# Patient Record
Sex: MALE | Race: BLACK OR AFRICAN AMERICAN | NOT HISPANIC OR LATINO | Employment: OTHER | URBAN - METROPOLITAN AREA
[De-identification: names, ages, dates, MRNs, and addresses within clinical notes are randomized per-mention and may not be internally consistent; named-entity substitution may affect disease eponyms.]

---

## 2023-03-07 LAB
ALBUMIN (G/DL) IN SER/PLAS: 4.2 G/DL (ref 3.4–5)
ANION GAP IN SER/PLAS: 12 MMOL/L (ref 10–20)
BASOPHILS (10*3/UL) IN BLOOD BY AUTOMATED COUNT: 0.02 X10E9/L (ref 0–0.1)
BASOPHILS/100 LEUKOCYTES IN BLOOD BY AUTOMATED COUNT: 0.5 % (ref 0–2)
BK VIRUS LOG PCR: 2.64 LOG IU/ML
BK VIRUS PCR QUANT: 441 IU/ML
CALCIUM (MG/DL) IN SER/PLAS: 9.9 MG/DL (ref 8.6–10.6)
CARBON DIOXIDE, TOTAL (MMOL/L) IN SER/PLAS: 29 MMOL/L (ref 21–32)
CHLORIDE (MMOL/L) IN SER/PLAS: 103 MMOL/L (ref 98–107)
CREATININE (MG/DL) IN SER/PLAS: 1.33 MG/DL (ref 0.5–1.3)
EOSINOPHILS (10*3/UL) IN BLOOD BY AUTOMATED COUNT: 0.05 X10E9/L (ref 0–0.7)
EOSINOPHILS/100 LEUKOCYTES IN BLOOD BY AUTOMATED COUNT: 1.2 % (ref 0–6)
ERYTHROCYTE DISTRIBUTION WIDTH (RATIO) BY AUTOMATED COUNT: 14.4 % (ref 11.5–14.5)
ERYTHROCYTE MEAN CORPUSCULAR HEMOGLOBIN CONCENTRATION (G/DL) BY AUTOMATED: 30.7 G/DL (ref 32–36)
ERYTHROCYTE MEAN CORPUSCULAR VOLUME (FL) BY AUTOMATED COUNT: 100 FL (ref 80–100)
ERYTHROCYTES (10*6/UL) IN BLOOD BY AUTOMATED COUNT: 4.1 X10E12/L (ref 4.5–5.9)
GFR MALE: 57 ML/MIN/1.73M2
GLUCOSE (MG/DL) IN SER/PLAS: 112 MG/DL (ref 74–99)
HEMATOCRIT (%) IN BLOOD BY AUTOMATED COUNT: 41.1 % (ref 41–52)
HEMOGLOBIN (G/DL) IN BLOOD: 12.6 G/DL (ref 13.5–17.5)
IMMATURE GRANULOCYTES/100 LEUKOCYTES IN BLOOD BY AUTOMATED COUNT: 0.5 % (ref 0–0.9)
LEUKOCYTES (10*3/UL) IN BLOOD BY AUTOMATED COUNT: 4.2 X10E9/L (ref 4.4–11.3)
LYMPHOCYTES (10*3/UL) IN BLOOD BY AUTOMATED COUNT: 0.76 X10E9/L (ref 1.2–4.8)
LYMPHOCYTES/100 LEUKOCYTES IN BLOOD BY AUTOMATED COUNT: 18.3 % (ref 13–44)
MONOCYTES (10*3/UL) IN BLOOD BY AUTOMATED COUNT: 0.45 X10E9/L (ref 0.1–1)
MONOCYTES/100 LEUKOCYTES IN BLOOD BY AUTOMATED COUNT: 10.8 % (ref 2–10)
NEUTROPHILS (10*3/UL) IN BLOOD BY AUTOMATED COUNT: 2.85 X10E9/L (ref 1.2–7.7)
NEUTROPHILS/100 LEUKOCYTES IN BLOOD BY AUTOMATED COUNT: 68.7 % (ref 40–80)
NRBC (PER 100 WBCS) BY AUTOMATED COUNT: 0 /100 WBC (ref 0–0)
PHOSPHATE (MG/DL) IN SER/PLAS: 2.6 MG/DL (ref 2.5–4.9)
PLATELETS (10*3/UL) IN BLOOD AUTOMATED COUNT: 152 X10E9/L (ref 150–450)
POTASSIUM (MMOL/L) IN SER/PLAS: 4.1 MMOL/L (ref 3.5–5.3)
SODIUM (MMOL/L) IN SER/PLAS: 140 MMOL/L (ref 136–145)
TACROLIMUS (NG/ML) IN BLOOD: 6.1 NG/ML (ref 2–15)
UREA NITROGEN (MG/DL) IN SER/PLAS: 22 MG/DL (ref 6–23)

## 2023-03-20 LAB
ALBUMIN (G/DL) IN SER/PLAS: 4 G/DL (ref 3.4–5)
ANION GAP IN SER/PLAS: 13 MMOL/L (ref 10–20)
BASOPHILS (10*3/UL) IN BLOOD BY AUTOMATED COUNT: 0.03 X10E9/L (ref 0–0.1)
BASOPHILS/100 LEUKOCYTES IN BLOOD BY AUTOMATED COUNT: 0.4 % (ref 0–2)
CALCIUM (MG/DL) IN SER/PLAS: 9.9 MG/DL (ref 8.6–10.6)
CARBON DIOXIDE, TOTAL (MMOL/L) IN SER/PLAS: 30 MMOL/L (ref 21–32)
CHLORIDE (MMOL/L) IN SER/PLAS: 101 MMOL/L (ref 98–107)
CREATININE (MG/DL) IN SER/PLAS: 1.47 MG/DL (ref 0.5–1.3)
CREATININE (MG/DL) IN URINE: 89.6 MG/DL (ref 20–370)
EOSINOPHILS (10*3/UL) IN BLOOD BY AUTOMATED COUNT: 0.05 X10E9/L (ref 0–0.7)
EOSINOPHILS/100 LEUKOCYTES IN BLOOD BY AUTOMATED COUNT: 0.6 % (ref 0–6)
ERYTHROCYTE DISTRIBUTION WIDTH (RATIO) BY AUTOMATED COUNT: 14 % (ref 11.5–14.5)
ERYTHROCYTE MEAN CORPUSCULAR HEMOGLOBIN CONCENTRATION (G/DL) BY AUTOMATED: 31.7 G/DL (ref 32–36)
ERYTHROCYTE MEAN CORPUSCULAR VOLUME (FL) BY AUTOMATED COUNT: 99 FL (ref 80–100)
ERYTHROCYTES (10*6/UL) IN BLOOD BY AUTOMATED COUNT: 4.09 X10E12/L (ref 4.5–5.9)
GFR MALE: 51 ML/MIN/1.73M2
GLUCOSE (MG/DL) IN SER/PLAS: 111 MG/DL (ref 74–99)
HEMATOCRIT (%) IN BLOOD BY AUTOMATED COUNT: 40.4 % (ref 41–52)
HEMOGLOBIN (G/DL) IN BLOOD: 12.8 G/DL (ref 13.5–17.5)
IMMATURE GRANULOCYTES/100 LEUKOCYTES IN BLOOD BY AUTOMATED COUNT: 0.6 % (ref 0–0.9)
LEUKOCYTES (10*3/UL) IN BLOOD BY AUTOMATED COUNT: 8.4 X10E9/L (ref 4.4–11.3)
LYMPHOCYTES (10*3/UL) IN BLOOD BY AUTOMATED COUNT: 1.13 X10E9/L (ref 1.2–4.8)
LYMPHOCYTES/100 LEUKOCYTES IN BLOOD BY AUTOMATED COUNT: 13.5 % (ref 13–44)
MONOCYTES (10*3/UL) IN BLOOD BY AUTOMATED COUNT: 0.78 X10E9/L (ref 0.1–1)
MONOCYTES/100 LEUKOCYTES IN BLOOD BY AUTOMATED COUNT: 9.3 % (ref 2–10)
NEUTROPHILS (10*3/UL) IN BLOOD BY AUTOMATED COUNT: 6.35 X10E9/L (ref 1.2–7.7)
NEUTROPHILS/100 LEUKOCYTES IN BLOOD BY AUTOMATED COUNT: 75.6 % (ref 40–80)
NRBC (PER 100 WBCS) BY AUTOMATED COUNT: 0 /100 WBC (ref 0–0)
PHOSPHATE (MG/DL) IN SER/PLAS: 3.4 MG/DL (ref 2.5–4.9)
PLATELETS (10*3/UL) IN BLOOD AUTOMATED COUNT: 154 X10E9/L (ref 150–450)
POTASSIUM (MMOL/L) IN SER/PLAS: 3.8 MMOL/L (ref 3.5–5.3)
PROTEIN (MG/DL) IN URINE: 7 MG/DL (ref 5–25)
PROTEIN/CREATININE (MG/MG) IN URINE: 0.08 MG/MG CREAT (ref 0–0.17)
SODIUM (MMOL/L) IN SER/PLAS: 140 MMOL/L (ref 136–145)
TACROLIMUS (NG/ML) IN BLOOD: 5.2 NG/ML (ref 2–15)
UREA NITROGEN (MG/DL) IN SER/PLAS: 23 MG/DL (ref 6–23)

## 2023-03-21 LAB
BK VIRUS LOG PCR: 2.44 LOG IU/ML
BK VIRUS PCR QUANT: 278 IU/ML

## 2023-04-03 LAB
CREATININE (MG/DL) IN URINE: 145 MG/DL (ref 20–370)
PROTEIN (MG/DL) IN URINE: 16 MG/DL (ref 5–25)
PROTEIN/CREATININE (MG/MG) IN URINE: 0.11 MG/MG CREAT (ref 0–0.17)

## 2023-04-17 LAB
ALBUMIN (G/DL) IN SER/PLAS: 4.1 G/DL (ref 3.4–5)
ANION GAP IN SER/PLAS: 13 MMOL/L (ref 10–20)
BASOPHILS (10*3/UL) IN BLOOD BY AUTOMATED COUNT: 0.03 X10E9/L (ref 0–0.1)
BASOPHILS/100 LEUKOCYTES IN BLOOD BY AUTOMATED COUNT: 0.6 % (ref 0–2)
CALCIUM (MG/DL) IN SER/PLAS: 9.9 MG/DL (ref 8.6–10.6)
CARBON DIOXIDE, TOTAL (MMOL/L) IN SER/PLAS: 29 MMOL/L (ref 21–32)
CHLORIDE (MMOL/L) IN SER/PLAS: 102 MMOL/L (ref 98–107)
CREATININE (MG/DL) IN SER/PLAS: 1.43 MG/DL (ref 0.5–1.3)
CREATININE (MG/DL) IN URINE: 41.8 MG/DL (ref 20–370)
EOSINOPHILS (10*3/UL) IN BLOOD BY AUTOMATED COUNT: 0.04 X10E9/L (ref 0–0.7)
EOSINOPHILS/100 LEUKOCYTES IN BLOOD BY AUTOMATED COUNT: 0.9 % (ref 0–6)
ERYTHROCYTE DISTRIBUTION WIDTH (RATIO) BY AUTOMATED COUNT: 13.6 % (ref 11.5–14.5)
ERYTHROCYTE MEAN CORPUSCULAR HEMOGLOBIN CONCENTRATION (G/DL) BY AUTOMATED: 31.3 G/DL (ref 32–36)
ERYTHROCYTE MEAN CORPUSCULAR VOLUME (FL) BY AUTOMATED COUNT: 98 FL (ref 80–100)
ERYTHROCYTES (10*6/UL) IN BLOOD BY AUTOMATED COUNT: 4.38 X10E12/L (ref 4.5–5.9)
GFR MALE: 53 ML/MIN/1.73M2
GLUCOSE (MG/DL) IN SER/PLAS: 124 MG/DL (ref 74–99)
HEMATOCRIT (%) IN BLOOD BY AUTOMATED COUNT: 42.8 % (ref 41–52)
HEMOGLOBIN (G/DL) IN BLOOD: 13.4 G/DL (ref 13.5–17.5)
IMMATURE GRANULOCYTES/100 LEUKOCYTES IN BLOOD BY AUTOMATED COUNT: 0.4 % (ref 0–0.9)
LEUKOCYTES (10*3/UL) IN BLOOD BY AUTOMATED COUNT: 4.6 X10E9/L (ref 4.4–11.3)
LYMPHOCYTES (10*3/UL) IN BLOOD BY AUTOMATED COUNT: 0.93 X10E9/L (ref 1.2–4.8)
LYMPHOCYTES/100 LEUKOCYTES IN BLOOD BY AUTOMATED COUNT: 20 % (ref 13–44)
MONOCYTES (10*3/UL) IN BLOOD BY AUTOMATED COUNT: 0.52 X10E9/L (ref 0.1–1)
MONOCYTES/100 LEUKOCYTES IN BLOOD BY AUTOMATED COUNT: 11.2 % (ref 2–10)
NEUTROPHILS (10*3/UL) IN BLOOD BY AUTOMATED COUNT: 3.1 X10E9/L (ref 1.2–7.7)
NEUTROPHILS/100 LEUKOCYTES IN BLOOD BY AUTOMATED COUNT: 66.9 % (ref 40–80)
NRBC (PER 100 WBCS) BY AUTOMATED COUNT: 0 /100 WBC (ref 0–0)
PHOSPHATE (MG/DL) IN SER/PLAS: 2.9 MG/DL (ref 2.5–4.9)
PLATELETS (10*3/UL) IN BLOOD AUTOMATED COUNT: 136 X10E9/L (ref 150–450)
POTASSIUM (MMOL/L) IN SER/PLAS: 4.7 MMOL/L (ref 3.5–5.3)
PROTEIN (MG/DL) IN URINE: <4 MG/DL (ref 5–25)
PROTEIN/CREATININE (MG/MG) IN URINE: ABNORMAL MG/MG CREAT (ref 0–0.17)
SODIUM (MMOL/L) IN SER/PLAS: 139 MMOL/L (ref 136–145)
TACROLIMUS (NG/ML) IN BLOOD: 8.8 NG/ML (ref 2–15)
UREA NITROGEN (MG/DL) IN SER/PLAS: 19 MG/DL (ref 6–23)

## 2023-04-18 LAB
BK VIRUS LOG PCR: 2.15 LOG IU/ML
BK VIRUS PCR QUANT: 141 IU/ML

## 2023-05-01 LAB
ALBUMIN (G/DL) IN SER/PLAS: 4.2 G/DL (ref 3.4–5)
ANION GAP IN SER/PLAS: 14 MMOL/L (ref 10–20)
CALCIUM (MG/DL) IN SER/PLAS: 9.7 MG/DL (ref 8.6–10.6)
CARBON DIOXIDE, TOTAL (MMOL/L) IN SER/PLAS: 30 MMOL/L (ref 21–32)
CHLORIDE (MMOL/L) IN SER/PLAS: 99 MMOL/L (ref 98–107)
CREATININE (MG/DL) IN SER/PLAS: 1.62 MG/DL (ref 0.5–1.3)
ERYTHROCYTE DISTRIBUTION WIDTH (RATIO) BY AUTOMATED COUNT: 14.2 % (ref 11.5–14.5)
ERYTHROCYTE MEAN CORPUSCULAR HEMOGLOBIN CONCENTRATION (G/DL) BY AUTOMATED: 31.3 G/DL (ref 32–36)
ERYTHROCYTE MEAN CORPUSCULAR VOLUME (FL) BY AUTOMATED COUNT: 97 FL (ref 80–100)
ERYTHROCYTES (10*6/UL) IN BLOOD BY AUTOMATED COUNT: 4.65 X10E12/L (ref 4.5–5.9)
GFR MALE: 45 ML/MIN/1.73M2
GLUCOSE (MG/DL) IN SER/PLAS: 128 MG/DL (ref 74–99)
HEMATOCRIT (%) IN BLOOD BY AUTOMATED COUNT: 45.3 % (ref 41–52)
HEMOGLOBIN (G/DL) IN BLOOD: 14.2 G/DL (ref 13.5–17.5)
LEUKOCYTES (10*3/UL) IN BLOOD BY AUTOMATED COUNT: 5.7 X10E9/L (ref 4.4–11.3)
MAGNESIUM (MG/DL) IN SER/PLAS: 1.94 MG/DL (ref 1.6–2.4)
NRBC (PER 100 WBCS) BY AUTOMATED COUNT: 0 /100 WBC (ref 0–0)
PHOSPHATE (MG/DL) IN SER/PLAS: 2.6 MG/DL (ref 2.5–4.9)
PLATELETS (10*3/UL) IN BLOOD AUTOMATED COUNT: 125 X10E9/L (ref 150–450)
POTASSIUM (MMOL/L) IN SER/PLAS: 3.7 MMOL/L (ref 3.5–5.3)
SODIUM (MMOL/L) IN SER/PLAS: 139 MMOL/L (ref 136–145)
UREA NITROGEN (MG/DL) IN SER/PLAS: 24 MG/DL (ref 6–23)

## 2023-05-02 LAB
BK VIRUS LOG PCR: 2.59 LOG IU/ML
BK VIRUS PCR QUANT: 386 IU/ML
CALCIDIOL (25 OH VITAMIN D3) (NG/ML) IN SER/PLAS: 30 NG/ML
CREATININE (MG/DL) IN URINE: 31.2 MG/DL (ref 20–370)
PARATHYRIN INTACT (PG/ML) IN SER/PLAS: 95.4 PG/ML (ref 18.5–88)
PROTEIN (MG/DL) IN URINE: <4 MG/DL (ref 5–25)
PROTEIN/CREATININE (MG/MG) IN URINE: ABNORMAL MG/MG CREAT (ref 0–0.17)
TACROLIMUS (NG/ML) IN BLOOD: 4.9 NG/ML (ref 2–15)

## 2023-05-12 LAB
ERYTHROCYTE DISTRIBUTION WIDTH (RATIO) BY AUTOMATED COUNT: 14.5 % (ref 11.5–14.5)
ERYTHROCYTE MEAN CORPUSCULAR HEMOGLOBIN CONCENTRATION (G/DL) BY AUTOMATED: 31.3 G/DL (ref 32–36)
ERYTHROCYTE MEAN CORPUSCULAR VOLUME (FL) BY AUTOMATED COUNT: 97 FL (ref 80–100)
ERYTHROCYTES (10*6/UL) IN BLOOD BY AUTOMATED COUNT: 4.95 X10E12/L (ref 4.5–5.9)
HEMATOCRIT (%) IN BLOOD BY AUTOMATED COUNT: 47.9 % (ref 41–52)
HEMOGLOBIN (G/DL) IN BLOOD: 15 G/DL (ref 13.5–17.5)
LEUKOCYTES (10*3/UL) IN BLOOD BY AUTOMATED COUNT: 7.3 X10E9/L (ref 4.4–11.3)
NRBC (PER 100 WBCS) BY AUTOMATED COUNT: 0 /100 WBC (ref 0–0)
PLATELETS (10*3/UL) IN BLOOD AUTOMATED COUNT: 144 X10E9/L (ref 150–450)
TACROLIMUS (NG/ML) IN BLOOD: 5.1 NG/ML (ref 2–15)

## 2023-05-13 LAB
ALBUMIN (G/DL) IN SER/PLAS: 4.1 G/DL (ref 3.4–5)
ANION GAP IN SER/PLAS: 14 MMOL/L (ref 10–20)
CALCIDIOL (25 OH VITAMIN D3) (NG/ML) IN SER/PLAS: 32 NG/ML
CALCIUM (MG/DL) IN SER/PLAS: 10 MG/DL (ref 8.6–10.6)
CARBON DIOXIDE, TOTAL (MMOL/L) IN SER/PLAS: 27 MMOL/L (ref 21–32)
CHLORIDE (MMOL/L) IN SER/PLAS: 102 MMOL/L (ref 98–107)
CREATININE (MG/DL) IN SER/PLAS: 1.5 MG/DL (ref 0.5–1.3)
CREATININE (MG/DL) IN URINE: 38.9 MG/DL (ref 20–370)
GFR MALE: 50 ML/MIN/1.73M2
GLUCOSE (MG/DL) IN SER/PLAS: 177 MG/DL (ref 74–99)
MAGNESIUM (MG/DL) IN SER/PLAS: 2.01 MG/DL (ref 1.6–2.4)
PARATHYRIN INTACT (PG/ML) IN SER/PLAS: 166.3 PG/ML (ref 18.5–88)
PHOSPHATE (MG/DL) IN SER/PLAS: 2.5 MG/DL (ref 2.5–4.9)
POTASSIUM (MMOL/L) IN SER/PLAS: 3.7 MMOL/L (ref 3.5–5.3)
PROTEIN (MG/DL) IN URINE: 7 MG/DL (ref 5–25)
PROTEIN/CREATININE (MG/MG) IN URINE: 0.18 MG/MG CREAT (ref 0–0.17)
SODIUM (MMOL/L) IN SER/PLAS: 139 MMOL/L (ref 136–145)
UREA NITROGEN (MG/DL) IN SER/PLAS: 16 MG/DL (ref 6–23)

## 2023-05-14 LAB
BK VIRUS LOG PCR: 2.72 LOG IU/ML
BK VIRUS PCR QUANT: 524 IU/ML

## 2023-06-01 LAB
ALBUMIN (G/DL) IN SER/PLAS: 4.2 G/DL (ref 3.4–5)
ANION GAP IN SER/PLAS: 18 MMOL/L (ref 10–20)
BK VIRUS LOG PCR: 2.81 LOG IU/ML
BK VIRUS PCR QUANT: 644 IU/ML
CALCIDIOL (25 OH VITAMIN D3) (NG/ML) IN SER/PLAS: 31 NG/ML
CALCIUM (MG/DL) IN SER/PLAS: 10.2 MG/DL (ref 8.6–10.6)
CARBON DIOXIDE, TOTAL (MMOL/L) IN SER/PLAS: 26 MMOL/L (ref 21–32)
CHLORIDE (MMOL/L) IN SER/PLAS: 100 MMOL/L (ref 98–107)
CREATININE (MG/DL) IN SER/PLAS: 1.36 MG/DL (ref 0.5–1.3)
CREATININE (MG/DL) IN URINE: 23.7 MG/DL (ref 20–370)
ERYTHROCYTE DISTRIBUTION WIDTH (RATIO) BY AUTOMATED COUNT: 15.1 % (ref 11.5–14.5)
ERYTHROCYTE MEAN CORPUSCULAR HEMOGLOBIN CONCENTRATION (G/DL) BY AUTOMATED: 30.6 G/DL (ref 32–36)
ERYTHROCYTE MEAN CORPUSCULAR VOLUME (FL) BY AUTOMATED COUNT: 100 FL (ref 80–100)
ERYTHROCYTES (10*6/UL) IN BLOOD BY AUTOMATED COUNT: 4.92 X10E12/L (ref 4.5–5.9)
GFR MALE: 56 ML/MIN/1.73M2
GLUCOSE (MG/DL) IN SER/PLAS: 148 MG/DL (ref 74–99)
HEMATOCRIT (%) IN BLOOD BY AUTOMATED COUNT: 49.3 % (ref 41–52)
HEMOGLOBIN (G/DL) IN BLOOD: 15.1 G/DL (ref 13.5–17.5)
LEUKOCYTES (10*3/UL) IN BLOOD BY AUTOMATED COUNT: 7.9 X10E9/L (ref 4.4–11.3)
MAGNESIUM (MG/DL) IN SER/PLAS: 1.87 MG/DL (ref 1.6–2.4)
NRBC (PER 100 WBCS) BY AUTOMATED COUNT: 0 /100 WBC (ref 0–0)
PARATHYRIN INTACT (PG/ML) IN SER/PLAS: 126.1 PG/ML (ref 18.5–88)
PHOSPHATE (MG/DL) IN SER/PLAS: 3.2 MG/DL (ref 2.5–4.9)
PLATELETS (10*3/UL) IN BLOOD AUTOMATED COUNT: 131 X10E9/L (ref 150–450)
POTASSIUM (MMOL/L) IN SER/PLAS: 3.8 MMOL/L (ref 3.5–5.3)
PROTEIN (MG/DL) IN URINE: <4 MG/DL (ref 5–25)
PROTEIN/CREATININE (MG/MG) IN URINE: ABNORMAL MG/MG CREAT (ref 0–0.17)
SODIUM (MMOL/L) IN SER/PLAS: 140 MMOL/L (ref 136–145)
TACROLIMUS (NG/ML) IN BLOOD: 5.9 NG/ML (ref 2–15)
UREA NITROGEN (MG/DL) IN SER/PLAS: 18 MG/DL (ref 6–23)

## 2023-07-03 LAB
ALBUMIN (G/DL) IN SER/PLAS: 3.9 G/DL (ref 3.4–5)
ANION GAP IN SER/PLAS: 13 MMOL/L (ref 10–20)
CALCIDIOL (25 OH VITAMIN D3) (NG/ML) IN SER/PLAS: 30 NG/ML
CALCIUM (MG/DL) IN SER/PLAS: 9.6 MG/DL (ref 8.6–10.6)
CARBON DIOXIDE, TOTAL (MMOL/L) IN SER/PLAS: 29 MMOL/L (ref 21–32)
CHLORIDE (MMOL/L) IN SER/PLAS: 104 MMOL/L (ref 98–107)
CREATININE (MG/DL) IN SER/PLAS: 1.44 MG/DL (ref 0.5–1.3)
CREATININE (MG/DL) IN URINE: 131 MG/DL (ref 20–370)
ERYTHROCYTE DISTRIBUTION WIDTH (RATIO) BY AUTOMATED COUNT: 15.2 % (ref 11.5–14.5)
ERYTHROCYTE MEAN CORPUSCULAR HEMOGLOBIN CONCENTRATION (G/DL) BY AUTOMATED: 31.7 G/DL (ref 32–36)
ERYTHROCYTE MEAN CORPUSCULAR VOLUME (FL) BY AUTOMATED COUNT: 99 FL (ref 80–100)
ERYTHROCYTES (10*6/UL) IN BLOOD BY AUTOMATED COUNT: 4.38 X10E12/L (ref 4.5–5.9)
GFR MALE: 52 ML/MIN/1.73M2
GLUCOSE (MG/DL) IN SER/PLAS: 133 MG/DL (ref 74–99)
HEMATOCRIT (%) IN BLOOD BY AUTOMATED COUNT: 43.2 % (ref 41–52)
HEMOGLOBIN (G/DL) IN BLOOD: 13.7 G/DL (ref 13.5–17.5)
LEUKOCYTES (10*3/UL) IN BLOOD BY AUTOMATED COUNT: 4.7 X10E9/L (ref 4.4–11.3)
MAGNESIUM (MG/DL) IN SER/PLAS: 1.53 MG/DL (ref 1.6–2.4)
NRBC (PER 100 WBCS) BY AUTOMATED COUNT: 0 /100 WBC (ref 0–0)
PARATHYRIN INTACT (PG/ML) IN SER/PLAS: 88.5 PG/ML (ref 18.5–88)
PHOSPHATE (MG/DL) IN SER/PLAS: 2.4 MG/DL (ref 2.5–4.9)
PLATELETS (10*3/UL) IN BLOOD AUTOMATED COUNT: 112 X10E9/L (ref 150–450)
POTASSIUM (MMOL/L) IN SER/PLAS: 3.9 MMOL/L (ref 3.5–5.3)
PROTEIN (MG/DL) IN URINE: 10 MG/DL (ref 5–25)
PROTEIN/CREATININE (MG/MG) IN URINE: 0.08 MG/MG CREAT (ref 0–0.17)
SODIUM (MMOL/L) IN SER/PLAS: 142 MMOL/L (ref 136–145)
TACROLIMUS (NG/ML) IN BLOOD: 5.4 NG/ML (ref 2–15)
UREA NITROGEN (MG/DL) IN SER/PLAS: 15 MG/DL (ref 6–23)

## 2023-07-05 LAB
BK VIRUS LOG PCR: 1.71 LOG IU/ML
BK VIRUS PCR QUANT: 51 IU/ML

## 2023-08-01 LAB
ALBUMIN (G/DL) IN SER/PLAS: 4.2 G/DL (ref 3.4–5)
ANION GAP IN SER/PLAS: 14 MMOL/L (ref 10–20)
BASOPHILS (10*3/UL) IN BLOOD BY AUTOMATED COUNT: 0.02 X10E9/L (ref 0–0.1)
BASOPHILS/100 LEUKOCYTES IN BLOOD BY AUTOMATED COUNT: 0.4 % (ref 0–2)
BK VIRUS LOG PCR: 1.43 LOG IU/ML
BK VIRUS PCR QUANT: 27 IU/ML
CALCIDIOL (25 OH VITAMIN D3) (NG/ML) IN SER/PLAS: 34 NG/ML
CALCIUM (MG/DL) IN SER/PLAS: 9.1 MG/DL (ref 8.6–10.6)
CARBON DIOXIDE, TOTAL (MMOL/L) IN SER/PLAS: 27 MMOL/L (ref 21–32)
CHLORIDE (MMOL/L) IN SER/PLAS: 102 MMOL/L (ref 98–107)
CREATININE (MG/DL) IN SER/PLAS: 1.43 MG/DL (ref 0.5–1.3)
CREATININE (MG/DL) IN URINE: 74.9 MG/DL (ref 20–370)
EOSINOPHILS (10*3/UL) IN BLOOD BY AUTOMATED COUNT: 0.03 X10E9/L (ref 0–0.7)
EOSINOPHILS/100 LEUKOCYTES IN BLOOD BY AUTOMATED COUNT: 0.6 % (ref 0–6)
ERYTHROCYTE DISTRIBUTION WIDTH (RATIO) BY AUTOMATED COUNT: 14.5 % (ref 11.5–14.5)
ERYTHROCYTE MEAN CORPUSCULAR HEMOGLOBIN CONCENTRATION (G/DL) BY AUTOMATED: 32.5 G/DL (ref 32–36)
ERYTHROCYTE MEAN CORPUSCULAR VOLUME (FL) BY AUTOMATED COUNT: 98 FL (ref 80–100)
ERYTHROCYTES (10*6/UL) IN BLOOD BY AUTOMATED COUNT: 4.29 X10E12/L (ref 4.5–5.9)
GFR MALE: 52 ML/MIN/1.73M2
GLUCOSE (MG/DL) IN SER/PLAS: 139 MG/DL (ref 74–99)
HEMATOCRIT (%) IN BLOOD BY AUTOMATED COUNT: 42.2 % (ref 41–52)
HEMOGLOBIN (G/DL) IN BLOOD: 13.7 G/DL (ref 13.5–17.5)
IMMATURE GRANULOCYTES/100 LEUKOCYTES IN BLOOD BY AUTOMATED COUNT: 0.4 % (ref 0–0.9)
LEUKOCYTES (10*3/UL) IN BLOOD BY AUTOMATED COUNT: 4.9 X10E9/L (ref 4.4–11.3)
LYMPHOCYTES (10*3/UL) IN BLOOD BY AUTOMATED COUNT: 0.69 X10E9/L (ref 1.2–4.8)
LYMPHOCYTES/100 LEUKOCYTES IN BLOOD BY AUTOMATED COUNT: 14.2 % (ref 13–44)
MAGNESIUM (MG/DL) IN SER/PLAS: 1.64 MG/DL (ref 1.6–2.4)
MONOCYTES (10*3/UL) IN BLOOD BY AUTOMATED COUNT: 0.39 X10E9/L (ref 0.1–1)
MONOCYTES/100 LEUKOCYTES IN BLOOD BY AUTOMATED COUNT: 8 % (ref 2–10)
NEUTROPHILS (10*3/UL) IN BLOOD BY AUTOMATED COUNT: 3.71 X10E9/L (ref 1.2–7.7)
NEUTROPHILS/100 LEUKOCYTES IN BLOOD BY AUTOMATED COUNT: 76.4 % (ref 40–80)
NRBC (PER 100 WBCS) BY AUTOMATED COUNT: 0 /100 WBC (ref 0–0)
PHOSPHATE (MG/DL) IN SER/PLAS: 1.9 MG/DL (ref 2.5–4.9)
PLATELETS (10*3/UL) IN BLOOD AUTOMATED COUNT: 115 X10E9/L (ref 150–450)
POTASSIUM (MMOL/L) IN SER/PLAS: 3.8 MMOL/L (ref 3.5–5.3)
PROTEIN (MG/DL) IN URINE: 5 MG/DL (ref 5–25)
PROTEIN/CREATININE (MG/MG) IN URINE: 0.07 MG/MG CREAT (ref 0–0.17)
SODIUM (MMOL/L) IN SER/PLAS: 139 MMOL/L (ref 136–145)
TACROLIMUS (NG/ML) IN BLOOD: 6.9 NG/ML (ref 2–15)
UREA NITROGEN (MG/DL) IN SER/PLAS: 14 MG/DL (ref 6–23)

## 2023-10-03 ENCOUNTER — PHARMACY VISIT (OUTPATIENT)
Dept: PHARMACY | Facility: CLINIC | Age: 73
End: 2023-10-03
Payer: MEDICARE

## 2023-10-03 PROCEDURE — RXMED WILLOW AMBULATORY MEDICATION CHARGE

## 2023-11-01 ENCOUNTER — PHARMACY VISIT (OUTPATIENT)
Dept: PHARMACY | Facility: CLINIC | Age: 73
End: 2023-11-01
Payer: MEDICARE

## 2023-11-01 PROCEDURE — RXMED WILLOW AMBULATORY MEDICATION CHARGE

## 2023-11-02 ENCOUNTER — SPECIALTY PHARMACY (OUTPATIENT)
Dept: PHARMACY | Facility: CLINIC | Age: 73
End: 2023-11-02

## 2023-11-03 ENCOUNTER — SPECIALTY PHARMACY (OUTPATIENT)
Dept: PHARMACY | Facility: CLINIC | Age: 73
End: 2023-11-03

## 2023-11-06 ENCOUNTER — LAB (OUTPATIENT)
Dept: LAB | Facility: LAB | Age: 73
End: 2023-11-06
Payer: MEDICARE

## 2023-11-06 DIAGNOSIS — Z94.0 KIDNEY TRANSPLANT STATUS (HHS-HCC): Primary | ICD-10-CM

## 2023-11-06 DIAGNOSIS — B34.8 OTHER VIRAL INFECTIONS OF UNSPECIFIED SITE: ICD-10-CM

## 2023-11-06 DIAGNOSIS — E55.9 VITAMIN D DEFICIENCY, UNSPECIFIED: ICD-10-CM

## 2023-11-06 PROCEDURE — 85027 COMPLETE CBC AUTOMATED: CPT

## 2023-11-06 PROCEDURE — 87799 DETECT AGENT NOS DNA QUANT: CPT

## 2023-11-06 PROCEDURE — 82306 VITAMIN D 25 HYDROXY: CPT

## 2023-11-06 PROCEDURE — 82570 ASSAY OF URINE CREATININE: CPT

## 2023-11-06 PROCEDURE — 84156 ASSAY OF PROTEIN URINE: CPT

## 2023-11-06 PROCEDURE — 80069 RENAL FUNCTION PANEL: CPT

## 2023-11-06 PROCEDURE — 36415 COLL VENOUS BLD VENIPUNCTURE: CPT

## 2023-11-06 PROCEDURE — 83970 ASSAY OF PARATHORMONE: CPT

## 2023-11-06 PROCEDURE — 83735 ASSAY OF MAGNESIUM: CPT

## 2023-11-06 PROCEDURE — 80197 ASSAY OF TACROLIMUS: CPT

## 2023-11-07 ENCOUNTER — TELEPHONE (OUTPATIENT)
Dept: TRANSPLANT | Facility: HOSPITAL | Age: 73
End: 2023-11-07
Payer: MEDICARE

## 2023-11-07 DIAGNOSIS — Z94.0 KIDNEY REPLACED BY TRANSPLANT (HHS-HCC): ICD-10-CM

## 2023-11-07 LAB
25(OH)D3 SERPL-MCNC: 34 NG/ML (ref 30–100)
ALBUMIN SERPL BCP-MCNC: 4 G/DL (ref 3.4–5)
ALLOSURE SCORE - KIDNEY: 0.22 %
ANION GAP SERPL CALC-SCNC: 16 MMOL/L (ref 10–20)
BKV DNA SERPL NAA+PROBE-LOG#: NORMAL {LOG_COPIES}/ML
BUN SERPL-MCNC: 16 MG/DL (ref 6–23)
CALCIUM SERPL-MCNC: 9.6 MG/DL (ref 8.6–10.6)
CHLORIDE SERPL-SCNC: 103 MMOL/L (ref 98–107)
CO2 SERPL-SCNC: 24 MMOL/L (ref 21–32)
CREAT SERPL-MCNC: 1.37 MG/DL (ref 0.5–1.3)
ERYTHROCYTE [DISTWIDTH] IN BLOOD BY AUTOMATED COUNT: 13.6 % (ref 11.5–14.5)
GFR SERPL CREATININE-BSD FRML MDRD: 55 ML/MIN/1.73M*2
GLUCOSE SERPL-MCNC: 110 MG/DL (ref 74–99)
HCT VFR BLD AUTO: 40.7 % (ref 41–52)
HGB BLD-MCNC: 13 G/DL (ref 13.5–17.5)
LABORATORY COMMENT REPORT: NOT DETECTED
MAGNESIUM SERPL-MCNC: 1.61 MG/DL (ref 1.6–2.4)
MCH RBC QN AUTO: 31.6 PG (ref 26–34)
MCHC RBC AUTO-ENTMCNC: 31.9 G/DL (ref 32–36)
MCV RBC AUTO: 99 FL (ref 80–100)
NRBC BLD-RTO: 0 /100 WBCS (ref 0–0)
PHOSPHATE SERPL-MCNC: 2.4 MG/DL (ref 2.5–4.9)
PLATELET # BLD AUTO: 111 X10*3/UL (ref 150–450)
POTASSIUM SERPL-SCNC: 3.9 MMOL/L (ref 3.5–5.3)
PTH-INTACT SERPL-MCNC: 89 PG/ML (ref 18.5–88)
RBC # BLD AUTO: 4.12 X10*6/UL (ref 4.5–5.9)
SODIUM SERPL-SCNC: 139 MMOL/L (ref 136–145)
TACROLIMUS BLD-MCNC: 8.4 NG/ML
WBC # BLD AUTO: 5.2 X10*3/UL (ref 4.4–11.3)

## 2023-11-07 NOTE — TELEPHONE ENCOUNTER
Called pt back after review labs with Dr. Adam decreased Envarsus to 7mg daily repeat level in 1 week

## 2023-11-08 LAB
CREAT UR-MCNC: 84.4 MG/DL (ref 20–370)
PROT UR-ACNC: 5 MG/DL (ref 5–25)
PROT/CREAT UR: 0.06 MG/MG CREAT (ref 0–0.17)

## 2023-11-09 ENCOUNTER — PHARMACY VISIT (OUTPATIENT)
Dept: PHARMACY | Facility: CLINIC | Age: 73
End: 2023-11-09
Payer: MEDICARE

## 2023-11-09 ENCOUNTER — SPECIALTY PHARMACY (OUTPATIENT)
Dept: PHARMACY | Facility: CLINIC | Age: 73
End: 2023-11-09

## 2023-11-09 LAB
ALLOSURE SCORE - KIDNEY: 0.17 %
CAREDX_ORDER_ID: NORMAL
CENTER_ORDER_ID: NORMAL
CLIENT SPECIMEN ID - ALLOSURE: NORMAL
DONOR RELATION - ALLOSURE: NORMAL
NOTES - ALLOSURE: NORMAL
RELATIVE CHANGE VALUE - KIDNEY: NORMAL %
TEST COMMENTS - ALLOSURE: NORMAL
TIME POST TX - ALLOSURE: NORMAL
TRANSPLANTED ORGAN - ALLOSURE: NORMAL
TX DATE - ALLOSURE/ALLOMAP: NORMAL
WP_ORDER_ID: NORMAL

## 2023-11-10 ENCOUNTER — TELEPHONE (OUTPATIENT)
Dept: TRANSPLANT | Facility: HOSPITAL | Age: 73
End: 2023-11-10
Payer: MEDICARE

## 2023-11-10 DIAGNOSIS — Z94.0 KIDNEY REPLACED BY TRANSPLANT (HHS-HCC): ICD-10-CM

## 2023-11-10 DIAGNOSIS — Z94.0 KIDNEY REPLACED BY TRANSPLANT (HHS-HCC): Primary | ICD-10-CM

## 2023-11-13 ENCOUNTER — PHARMACY VISIT (OUTPATIENT)
Dept: PHARMACY | Facility: CLINIC | Age: 73
End: 2023-11-13
Payer: MEDICARE

## 2023-11-13 ENCOUNTER — SPECIALTY PHARMACY (OUTPATIENT)
Dept: PHARMACY | Facility: CLINIC | Age: 73
End: 2023-11-13

## 2023-11-13 ENCOUNTER — TELEPHONE (OUTPATIENT)
Dept: TRANSPLANT | Facility: HOSPITAL | Age: 73
End: 2023-11-13
Payer: MEDICARE

## 2023-11-13 PROCEDURE — RXMED WILLOW AMBULATORY MEDICATION CHARGE

## 2023-11-21 ENCOUNTER — TELEPHONE (OUTPATIENT)
Dept: TRANSPLANT | Facility: HOSPITAL | Age: 73
End: 2023-11-21
Payer: MEDICARE

## 2023-11-21 DIAGNOSIS — Z94.0 KIDNEY REPLACED BY TRANSPLANT (HHS-HCC): ICD-10-CM

## 2023-11-22 ENCOUNTER — TELEPHONE (OUTPATIENT)
Dept: TRANSPLANT | Facility: HOSPITAL | Age: 73
End: 2023-11-22
Payer: MEDICARE

## 2023-12-04 ENCOUNTER — LAB (OUTPATIENT)
Dept: LAB | Facility: LAB | Age: 73
End: 2023-12-04
Payer: MEDICARE

## 2023-12-04 DIAGNOSIS — Z94.0 KIDNEY REPLACED BY TRANSPLANT (HHS-HCC): ICD-10-CM

## 2023-12-04 LAB
ALBUMIN SERPL BCP-MCNC: 4.1 G/DL (ref 3.4–5)
ANION GAP SERPL CALC-SCNC: 14 MMOL/L (ref 10–20)
BUN SERPL-MCNC: 18 MG/DL (ref 6–23)
CALCIUM SERPL-MCNC: 9.6 MG/DL (ref 8.6–10.6)
CHLORIDE SERPL-SCNC: 103 MMOL/L (ref 98–107)
CO2 SERPL-SCNC: 28 MMOL/L (ref 21–32)
CREAT SERPL-MCNC: 1.34 MG/DL (ref 0.5–1.3)
ERYTHROCYTE [DISTWIDTH] IN BLOOD BY AUTOMATED COUNT: 13.9 % (ref 11.5–14.5)
GFR SERPL CREATININE-BSD FRML MDRD: 57 ML/MIN/1.73M*2
GLUCOSE SERPL-MCNC: 116 MG/DL (ref 74–99)
HCT VFR BLD AUTO: 42.7 % (ref 41–52)
HGB BLD-MCNC: 13.5 G/DL (ref 13.5–17.5)
MAGNESIUM SERPL-MCNC: 1.76 MG/DL (ref 1.6–2.4)
MCH RBC QN AUTO: 31.3 PG (ref 26–34)
MCHC RBC AUTO-ENTMCNC: 31.6 G/DL (ref 32–36)
MCV RBC AUTO: 99 FL (ref 80–100)
NRBC BLD-RTO: 0 /100 WBCS (ref 0–0)
PHOSPHATE SERPL-MCNC: 2.8 MG/DL (ref 2.5–4.9)
PLATELET # BLD AUTO: 129 X10*3/UL (ref 150–450)
POTASSIUM SERPL-SCNC: 4.3 MMOL/L (ref 3.5–5.3)
RBC # BLD AUTO: 4.31 X10*6/UL (ref 4.5–5.9)
SODIUM SERPL-SCNC: 141 MMOL/L (ref 136–145)
TACROLIMUS BLD-MCNC: 8.2 NG/ML
WBC # BLD AUTO: 5.6 X10*3/UL (ref 4.4–11.3)

## 2023-12-04 PROCEDURE — 85027 COMPLETE CBC AUTOMATED: CPT

## 2023-12-04 PROCEDURE — 80069 RENAL FUNCTION PANEL: CPT

## 2023-12-04 PROCEDURE — 36415 COLL VENOUS BLD VENIPUNCTURE: CPT

## 2023-12-04 PROCEDURE — 80197 ASSAY OF TACROLIMUS: CPT

## 2023-12-04 PROCEDURE — 83735 ASSAY OF MAGNESIUM: CPT

## 2023-12-13 ENCOUNTER — SPECIALTY PHARMACY (OUTPATIENT)
Dept: PHARMACY | Facility: CLINIC | Age: 73
End: 2023-12-13

## 2023-12-13 PROCEDURE — RXMED WILLOW AMBULATORY MEDICATION CHARGE

## 2023-12-14 ENCOUNTER — PHARMACY VISIT (OUTPATIENT)
Dept: PHARMACY | Facility: CLINIC | Age: 73
End: 2023-12-14
Payer: MEDICARE

## 2023-12-29 ENCOUNTER — SPECIALTY PHARMACY (OUTPATIENT)
Dept: PHARMACY | Facility: CLINIC | Age: 73
End: 2023-12-29

## 2024-01-03 PROCEDURE — RXMED WILLOW AMBULATORY MEDICATION CHARGE

## 2024-01-08 ENCOUNTER — LAB (OUTPATIENT)
Dept: LAB | Facility: LAB | Age: 74
End: 2024-01-08
Payer: MEDICARE

## 2024-01-08 DIAGNOSIS — Z94.0 KIDNEY REPLACED BY TRANSPLANT (HHS-HCC): ICD-10-CM

## 2024-01-08 LAB
ALBUMIN SERPL BCP-MCNC: 4 G/DL (ref 3.4–5)
ANION GAP SERPL CALC-SCNC: 14 MMOL/L (ref 10–20)
BUN SERPL-MCNC: 15 MG/DL (ref 6–23)
CALCIUM SERPL-MCNC: 9.6 MG/DL (ref 8.6–10.6)
CHLORIDE SERPL-SCNC: 102 MMOL/L (ref 98–107)
CO2 SERPL-SCNC: 29 MMOL/L (ref 21–32)
CREAT SERPL-MCNC: 1.36 MG/DL (ref 0.5–1.3)
CREAT UR-MCNC: 21.1 MG/DL (ref 20–370)
EGFRCR SERPLBLD CKD-EPI 2021: 56 ML/MIN/1.73M*2
ERYTHROCYTE [DISTWIDTH] IN BLOOD BY AUTOMATED COUNT: 14 % (ref 11.5–14.5)
GLUCOSE SERPL-MCNC: 173 MG/DL (ref 74–99)
HCT VFR BLD AUTO: 41.8 % (ref 41–52)
HGB BLD-MCNC: 13.4 G/DL (ref 13.5–17.5)
MAGNESIUM SERPL-MCNC: 1.66 MG/DL (ref 1.6–2.4)
MCH RBC QN AUTO: 31.5 PG (ref 26–34)
MCHC RBC AUTO-ENTMCNC: 32.1 G/DL (ref 32–36)
MCV RBC AUTO: 98 FL (ref 80–100)
NRBC BLD-RTO: 0.5 /100 WBCS (ref 0–0)
PHOSPHATE SERPL-MCNC: 2.7 MG/DL (ref 2.5–4.9)
PLATELET # BLD AUTO: 135 X10*3/UL (ref 150–450)
POTASSIUM SERPL-SCNC: 3.6 MMOL/L (ref 3.5–5.3)
PROT UR-ACNC: <4 MG/DL (ref 5–25)
PROT/CREAT UR: ABNORMAL MG/G{CREAT}
RBC # BLD AUTO: 4.25 X10*6/UL (ref 4.5–5.9)
SODIUM SERPL-SCNC: 141 MMOL/L (ref 136–145)
TACROLIMUS BLD-MCNC: 5 NG/ML
WBC # BLD AUTO: 7.7 X10*3/UL (ref 4.4–11.3)

## 2024-01-08 PROCEDURE — 80197 ASSAY OF TACROLIMUS: CPT

## 2024-01-08 PROCEDURE — 36415 COLL VENOUS BLD VENIPUNCTURE: CPT

## 2024-01-08 PROCEDURE — 84156 ASSAY OF PROTEIN URINE: CPT

## 2024-01-08 PROCEDURE — 83735 ASSAY OF MAGNESIUM: CPT

## 2024-01-08 PROCEDURE — 85027 COMPLETE CBC AUTOMATED: CPT

## 2024-01-08 PROCEDURE — 80069 RENAL FUNCTION PANEL: CPT

## 2024-01-08 PROCEDURE — 82570 ASSAY OF URINE CREATININE: CPT

## 2024-01-18 ENCOUNTER — PHARMACY VISIT (OUTPATIENT)
Dept: PHARMACY | Facility: CLINIC | Age: 74
End: 2024-01-18
Payer: MEDICARE

## 2024-02-05 ENCOUNTER — LAB (OUTPATIENT)
Dept: LAB | Facility: LAB | Age: 74
End: 2024-02-05
Payer: MEDICARE

## 2024-02-05 DIAGNOSIS — Z94.0 KIDNEY REPLACED BY TRANSPLANT (HHS-HCC): ICD-10-CM

## 2024-02-05 DIAGNOSIS — Z94.0 KIDNEY TRANSPLANT STATUS (HHS-HCC): Primary | ICD-10-CM

## 2024-02-05 LAB
ALBUMIN SERPL BCP-MCNC: 3.9 G/DL (ref 3.4–5)
ANION GAP SERPL CALC-SCNC: 14 MMOL/L (ref 10–20)
BUN SERPL-MCNC: 18 MG/DL (ref 6–23)
CALCIUM SERPL-MCNC: 9.7 MG/DL (ref 8.6–10.6)
CHLORIDE SERPL-SCNC: 104 MMOL/L (ref 98–107)
CO2 SERPL-SCNC: 29 MMOL/L (ref 21–32)
CREAT SERPL-MCNC: 1.43 MG/DL (ref 0.5–1.3)
EGFRCR SERPLBLD CKD-EPI 2021: 52 ML/MIN/1.73M*2
ERYTHROCYTE [DISTWIDTH] IN BLOOD BY AUTOMATED COUNT: 14.3 % (ref 11.5–14.5)
GLUCOSE SERPL-MCNC: 136 MG/DL (ref 74–99)
HCT VFR BLD AUTO: 42.2 % (ref 41–52)
HGB BLD-MCNC: 13.5 G/DL (ref 13.5–17.5)
MAGNESIUM SERPL-MCNC: 1.79 MG/DL (ref 1.6–2.4)
MCH RBC QN AUTO: 32.1 PG (ref 26–34)
MCHC RBC AUTO-ENTMCNC: 32 G/DL (ref 32–36)
MCV RBC AUTO: 100 FL (ref 80–100)
NRBC BLD-RTO: 0 /100 WBCS (ref 0–0)
PHOSPHATE SERPL-MCNC: 2.2 MG/DL (ref 2.5–4.9)
PLATELET # BLD AUTO: 125 X10*3/UL (ref 150–450)
POTASSIUM SERPL-SCNC: 3.8 MMOL/L (ref 3.5–5.3)
RBC # BLD AUTO: 4.21 X10*6/UL (ref 4.5–5.9)
SODIUM SERPL-SCNC: 143 MMOL/L (ref 136–145)
TACROLIMUS BLD-MCNC: 6 NG/ML
WBC # BLD AUTO: 5.2 X10*3/UL (ref 4.4–11.3)

## 2024-02-05 PROCEDURE — 85027 COMPLETE CBC AUTOMATED: CPT

## 2024-02-05 PROCEDURE — 83735 ASSAY OF MAGNESIUM: CPT

## 2024-02-05 PROCEDURE — 80197 ASSAY OF TACROLIMUS: CPT

## 2024-02-05 PROCEDURE — 80069 RENAL FUNCTION PANEL: CPT

## 2024-02-05 PROCEDURE — 36415 COLL VENOUS BLD VENIPUNCTURE: CPT

## 2024-02-07 ENCOUNTER — OFFICE VISIT (OUTPATIENT)
Dept: TRANSPLANT | Facility: CLINIC | Age: 74
End: 2024-02-07
Payer: MEDICARE

## 2024-02-07 VITALS
WEIGHT: 189 LBS | TEMPERATURE: 97.9 F | HEART RATE: 78 BPM | OXYGEN SATURATION: 98 % | BODY MASS INDEX: 27.91 KG/M2 | DIASTOLIC BLOOD PRESSURE: 73 MMHG | SYSTOLIC BLOOD PRESSURE: 144 MMHG

## 2024-02-07 DIAGNOSIS — E55.9 VITAMIN D DEFICIENCY: ICD-10-CM

## 2024-02-07 DIAGNOSIS — E21.3 HYPERPARATHYROIDISM (MULTI): ICD-10-CM

## 2024-02-07 DIAGNOSIS — Z94.0 KIDNEY REPLACED BY TRANSPLANT (HHS-HCC): Primary | ICD-10-CM

## 2024-02-07 LAB
ALLOSURE SCORE - KIDNEY: 0.16 %
CAREDX_ORDER_ID: NORMAL
CENTER_ORDER_ID: NORMAL
CLIENT SPECIMEN ID - ALLOSURE: NORMAL
DONOR RELATION - ALLOSURE: NORMAL
NOTES - ALLOSURE: NORMAL
RELATIVE CHANGE VALUE - KIDNEY: NORMAL %
TEST COMMENTS - ALLOSURE: NORMAL
TIME POST TX - ALLOSURE: NORMAL
TRANSPLANTED ORGAN - ALLOSURE: NORMAL
TX DATE - ALLOSURE/ALLOMAP: NORMAL
WP_ORDER_ID: NORMAL

## 2024-02-07 PROCEDURE — 1126F AMNT PAIN NOTED NONE PRSNT: CPT

## 2024-02-07 PROCEDURE — 1157F ADVNC CARE PLAN IN RCRD: CPT

## 2024-02-07 PROCEDURE — 99214 OFFICE O/P EST MOD 30 MIN: CPT

## 2024-02-07 ASSESSMENT — PAIN SCALES - GENERAL: PAINLEVEL: 0-NO PAIN

## 2024-02-07 NOTE — PROGRESS NOTES
TRANSPLANT NEPHROLOGY :   OUTPATIENT CLINIC NOTE      SERVICE DATE : 2024    REASON FOR VISIT/CHIEF COMPLAINT:  S/P  TRANSPLANT SURGERY  IMMUNOSUPPRESSIVE MEDICATION MANAGEMENT  BLOOD PRESSURE MANAGEMENT    HPI:    Mr. Hayes is a 73 y.o. male with past medical history significant for  end stage renal disease secondary to hypertensive nephropathy status post living donor kidney transplant from his wife on 2002 with graft failure on 2014. He then underwent a  donor kidney transplant on 2020.     Patient is here to follow up S/P kidney transplant surgery.     Patient is doing well overall. No new complaints. Denied chest pain, SOB, BARRERA, Palpitation. Normal urination and bowel movement. Normal gait and no weakness of arms/legs. No cough, runny nose, sore throat, cold symptoms, or rash. No hearing loss. Normal vision.No problems with his sleep, mood and function. No recent infection, hospitalization, surgery or ER visits.      ROS:  Review of  14 systems was performed system by system. See HPI. Otherwise, the symptoms were negative.    PAST MEDICAL HISTORY:  Past Medical History:   Diagnosis Date    Chronic obstructive pulmonary disease, unspecified (CMS/[facility]) 2014    Chronic obstructive pulmonary disease    Elevation of levels of liver transaminase levels     ALT (SGPT) level raised    Encounter for other preprocedural examination 2019    Pre-transplant evaluation for kidney transplant    Encounter for other preprocedural examination 2020    Pre-transplant evaluation for ESRD (end stage renal disease)    End stage renal disease (CMS/[facility]) 2020    End stage renal disease    Essential (primary) hypertension 2022    Hypertension, unspecified type    Hepatic fibrosis, unspecified 10/01/2015    Hepatic fibrosis    Localized edema 2020    Edema of right lower extremity    Noninfective gastroenteritis and colitis, unspecified     Colitis    Other  conditions influencing health status 05/08/2014    Coronary Artery Disease    Other specified abnormal findings of blood chemistry     Abnormal liver function test    Personal history of diseases of the blood and blood-forming organs and certain disorders involving the immune mechanism     History of anemia    Personal history of other diseases of the circulatory system 02/24/2017    History of angina pectoris    Personal history of other diseases of the circulatory system 02/24/2017    History of congestive heart disease    Personal history of other diseases of the digestive system     History of gastrointestinal hemorrhage    Personal history of other endocrine, nutritional and metabolic disease 11/24/2015    History of hyperlipidemia    Personal history of other endocrine, nutritional and metabolic disease 09/21/2020    History of hypercalcemia    Personal history of other infectious and parasitic diseases 07/26/2013    History of pseudomembranous enterocolitis    Unspecified complication of kidney transplant 08/14/2015    Renal transplant disorder        PAST SURGICAL HISTORY:  Past Surgical History:   Procedure Laterality Date    CORONARY ANGIOPLASTY WITH STENT PLACEMENT  10/01/2015    Cath Stent Placement    CORONARY ANGIOPLASTY WITH STENT PLACEMENT  10/01/2015    Cath Placement Of Stent 1    CORONARY ARTERY BYPASS GRAFT  10/01/2015    Coronary Artery Surgery    CT ABDOMEN PELVIS ANGIOGRAM W AND/OR WO IV CONTRAST  8/8/2019    CT ABDOMEN PELVIS ANGIOGRAM W AND/OR WO IV CONTRAST 8/8/2019 [facility] [place] ANCILLARY LEGACY    CT ABDOMEN PELVIS ANGIOGRAM W AND/OR WO IV CONTRAST  11/6/2019    CT ABDOMEN PELVIS ANGIOGRAM W AND/OR WO IV CONTRAST 11/6/2019 [facility] [place] ANCILLARY LEGACY    CT ABDOMEN PELVIS ANGIOGRAM W AND/OR WO IV CONTRAST  1/9/2020    CT ABDOMEN PELVIS ANGIOGRAM W AND/OR WO IV CONTRAST 1/9/2020 [facility] [place] ANCILLARY LEGACY    CT ABDOMEN PELVIS ANGIOGRAM W AND/OR WO IV CONTRAST  2/27/2020    CT ABDOMEN PELVIS ANGIOGRAM W AND/OR WO  IV CONTRAST 2/27/2020 [facility] [place] [facility] [place]    CT ABDOMEN PELVIS ANGIOGRAM W AND/OR WO IV CONTRAST  12/1/2016    CT ABDOMEN PELVIS ANGIOGRAM W AND/OR WO IV CONTRAST 12/1/2016 [facility] ANCILLARY LEGACY    HERNIA REPAIR  10/01/2015    Hernia Repair    IR EMBOLIZATION LYMPH NODE Bilateral 10/21/2019    IR EMBOLIZATION LYMPH NODE 10/21/2019 [facility] [place] [facility] [place] [facility] [place] [place]    IR INTERVENTION VENOUS ARTERIAL PTA  2/4/2020    IR INTERVENTION VENOUS ARTERIAL PTA 2/4/2020 [facility] [place] [facility] [place] [facility]    OTHER SURGICAL HISTORY  12/17/2013    Sigmoidoscopy (Fiberop) W/ Directed Submucosal Injection(S)    OTHER SURGICAL HISTORY  10/01/2015    Arteriovenous Surgery Creation Of A-V Fistula    OTHER SURGICAL HISTORY  10/01/2015    Hemodialysis Access Type Catheter 2 Lumen    OTHER SURGICAL HISTORY  07/06/2020    Renal Transplant        SOCIAL HISTORY:  Social History     Socioeconomic History    Marital status:      Spouse name: Not on file    Number of children: Not on file    Years of education: Not on file    Highest education level: Not on file   Occupational History    Not on file   Tobacco Use    Smoking status: Not on file    Smokeless tobacco: Not on file   Substance and Sexual Activity    Alcohol use: Not on file    Drug use: Not on file    Sexual activity: Not on file   Other Topics Concern    Not on file   Social History Narrative    Not on file     Social Determinants of Health     Financial Resource Strain: Not on file   Food Insecurity: Not on file   Transportation Needs: Not on file   Physical Activity: Not on file   Stress: Not on file   Social Connections: Not on file   Intimate Partner Violence: Not on file   Housing Stability: Not on file       FAMILY HISTORY:  No family history on file.    MEDICATION LIST:  Current Outpatient Medications   Medication Instructions    Envarsus XR 4 mg, oral, Daily    tacrolimus ER (Envarsus XR) 1 mg tablet ER Take 3 tablets (3 mg) by mouth once daily. Take 3 tablets daily with 4mg tablet for total of 7 mg  daily.       ALLERGY  Not on File    PHYSICAL EXAM:    Visit Vitals  /73   Pulse 78   Temp 36.6 °C (97.9 °F) (Temporal)   Wt 85.7 kg (189 lb)   SpO2 98%   BMI 27.91 kg/m²   BSA 2.04 m²          Vital signs - reviewed. Acceptable BP at this office visit.   General Appearance - NAD, Good speech, oriented and alert  HEENT - Supple. Not pale. No jaundice. No cervical lymphadenopathy. Pharynx and tonsils are not injected.  CVS - RRR. Normal S1/S2. No murmur, click , rub or gallop  Lungs- clear to auscultation bilaterally  Abdomen - soft , not tender, no guarding, no rigidity. No hepatosplenomegaly. Normal bowel sounds. No masses and ascites. S/P Kidney transplant .  Transplanted kidney is not tender.   Musculoskeletal /Extremities - no edema. Full ROM. No joint tenderness.   Neuro/Psych - appropriate mood and affect. Motor power V/V all extremities. CN I -XII were grossly intact.  Skin - No visible rash      LABS:    Lab Results   Component Value Date    WBC 5.2 2024    HGB 13.5 2024    HCT 42.2 2024     (L) 2024    ALT 16 2020    AST 15 2020     2024    K 3.8 2024     2024    CREATININE 1.43 (H) 2024    BUN 18 2024    CO2 29 2024    TSH 0.47 2020    INR 1.1 2021    HGBA1C 4.6 2019     par    ASSESSMENT AND PLAN:    Mr. Hayes is a 73 y.o. male  who is here for follow up s/p kidney transplant.    TRANSPLANT DATE: 2020 (Kidney), 2002 (Kidney)    Primary Cause of Organ Disease: Hypertensive Nephrosclerosis.   Donor/Transplant Type: A  donor renal transplant on: 2020,~A living donor renal transplant on: 2002 with graft failure 2014.   Transplant Course: standard thymoglobulin induction,~No DGF,~No CMV mismatch,~No EBV mismatch,~KDPI: 46%~and~PRA: 0.   Rejections: No episodes of rejection.   Infection: Other: fistula infection (2018), groin fungal infection (2012), UTI, BKV.    Malignancies After Transplant: No episodes of post transplant malignancy.      1. ESRD S/P kidney transplant   - Creatinine last check was :  Lab Results   Component Value Date    CREATININE 1.43 (H) 02/05/2024     Serum creatinine: 1.43 mg/dL (H) 02/05/24 0944  Estimated creatinine clearance: 51.4 mL/min (A)    - Renal allograft function is STABLE.  -Random urine protein/creatinine ratio is NORMAL  -Allosure last check was pending  -Ensure adequate hydration  - Avoid nephrotoxic medications, NSAIDs, and IV contrast.    2. Immunosuppression  -Tacrolimus level last check was 6; at goal  - OFF Myfortic for BK Viremia  -Continue current immunosuppression regimen.    3. Electrolytes  Lab Results   Component Value Date    GLUCOSE 136 (H) 02/05/2024    CALCIUM 9.7 02/05/2024     02/05/2024    K 3.8 02/05/2024    CO2 29 02/05/2024     02/05/2024    BUN 18 02/05/2024    CREATININE 1.43 (H) 02/05/2024     -Acceptable from last lab drawn    4. Hypertension  Blood Pressures         2/7/2024  1121             BP: 144/73          -Home  BP had been acceptable  -Encourage to monitor home BP  -Continue current anti hypertensive medication    5. Bone Mineral Disease/Osteoporosis  Lab Results   Component Value Date    PTH 89.0 (H) 11/06/2023    CALCIUM 9.7 02/05/2024    PHOS 2.2 (L) 02/05/2024    VITD25 34 11/06/2023     -check VIT D, PTH with next lab  - Consider DEXA every 2-3 years , defer to PCP    6.Anemia  Lab Results   Component Value Date    WBC 5.2 02/05/2024    HGB 13.5 02/05/2024    HCT 42.2 02/05/2024     02/05/2024     (L) 02/05/2024     -asymptomatic  - Continue to monitor  -check iron studies and ferritin. Will consider CELENA as needed.  - No indications for blood transfusion     7.Health maintenance and vaccination  - Flu shot during flu season annually  - Cancer screening is up to date per the patient    8. Hx of BK Viremia  - Last PCR not detected     Lab : Routine transplant lab ( CBC,  RFP, and anti-rejection trough level ) every 3 months  Additional labs:  VIT D, PTH with next lab  BK PLASMA PCR, Allosure and UPC per protocol.    RTC 3-4  month(s) to decide if we should restart MMF LOW dose. HOLD off for now until we get repeated BK PCR. Noted recent COVID infection    De Dodson    Transplant Nephrology

## 2024-02-16 ENCOUNTER — SPECIALTY PHARMACY (OUTPATIENT)
Dept: PHARMACY | Facility: CLINIC | Age: 74
End: 2024-02-16

## 2024-02-16 ENCOUNTER — PHARMACY VISIT (OUTPATIENT)
Dept: PHARMACY | Facility: CLINIC | Age: 74
End: 2024-02-16
Payer: MEDICARE

## 2024-02-16 PROCEDURE — RXMED WILLOW AMBULATORY MEDICATION CHARGE

## 2024-02-21 ENCOUNTER — APPOINTMENT (OUTPATIENT)
Dept: TRANSPLANT | Facility: CLINIC | Age: 74
End: 2024-02-21

## 2024-02-21 ENCOUNTER — APPOINTMENT (OUTPATIENT)
Dept: TRANSPLANT | Facility: CLINIC | Age: 74
End: 2024-02-21
Payer: MEDICARE

## 2024-03-11 ENCOUNTER — TELEPHONE (OUTPATIENT)
Dept: TRANSPLANT | Facility: HOSPITAL | Age: 74
End: 2024-03-11

## 2024-03-20 ENCOUNTER — HOSPITAL ENCOUNTER (OUTPATIENT)
Dept: RADIOLOGY | Facility: HOSPITAL | Age: 74
Discharge: HOME | End: 2024-03-20
Payer: MEDICARE

## 2024-03-20 ENCOUNTER — HOSPITAL ENCOUNTER (OUTPATIENT)
Dept: VASCULAR MEDICINE | Facility: HOSPITAL | Age: 74
Discharge: HOME | End: 2024-03-20
Payer: MEDICARE

## 2024-03-20 DIAGNOSIS — I72.3 ANEURYSM OF ILIAC ARTERY (CMS-HCC): ICD-10-CM

## 2024-03-20 DIAGNOSIS — Z95.828 PRESENCE OF OTHER VASCULAR IMPLANTS AND GRAFTS: ICD-10-CM

## 2024-03-20 PROCEDURE — 72192 CT PELVIS W/O DYE: CPT

## 2024-03-20 PROCEDURE — 93978 VASCULAR STUDY: CPT

## 2024-03-20 PROCEDURE — 93978 VASCULAR STUDY: CPT | Performed by: SURGERY

## 2024-03-21 ENCOUNTER — PHARMACY VISIT (OUTPATIENT)
Dept: PHARMACY | Facility: CLINIC | Age: 74
End: 2024-03-21
Payer: MEDICARE

## 2024-03-21 ENCOUNTER — SPECIALTY PHARMACY (OUTPATIENT)
Dept: PHARMACY | Facility: CLINIC | Age: 74
End: 2024-03-21

## 2024-03-21 PROCEDURE — RXMED WILLOW AMBULATORY MEDICATION CHARGE

## 2024-03-27 ENCOUNTER — SPECIALTY PHARMACY (OUTPATIENT)
Dept: PHARMACY | Facility: CLINIC | Age: 74
End: 2024-03-27

## 2024-04-01 ENCOUNTER — LAB (OUTPATIENT)
Dept: LAB | Facility: LAB | Age: 74
End: 2024-04-01
Payer: MEDICARE

## 2024-04-01 DIAGNOSIS — Z94.0 KIDNEY REPLACED BY TRANSPLANT (HHS-HCC): ICD-10-CM

## 2024-04-01 LAB
ALBUMIN SERPL BCP-MCNC: 3.9 G/DL (ref 3.4–5)
ANION GAP SERPL CALC-SCNC: 15 MMOL/L (ref 10–20)
BUN SERPL-MCNC: 15 MG/DL (ref 6–23)
CALCIUM SERPL-MCNC: 10.2 MG/DL (ref 8.6–10.6)
CHLORIDE SERPL-SCNC: 102 MMOL/L (ref 98–107)
CO2 SERPL-SCNC: 28 MMOL/L (ref 21–32)
CREAT SERPL-MCNC: 1.42 MG/DL (ref 0.5–1.3)
EGFRCR SERPLBLD CKD-EPI 2021: 53 ML/MIN/1.73M*2
ERYTHROCYTE [DISTWIDTH] IN BLOOD BY AUTOMATED COUNT: 13.7 % (ref 11.5–14.5)
GLUCOSE SERPL-MCNC: 175 MG/DL (ref 74–99)
HCT VFR BLD AUTO: 42.5 % (ref 41–52)
HGB BLD-MCNC: 13.7 G/DL (ref 13.5–17.5)
MAGNESIUM SERPL-MCNC: 1.66 MG/DL (ref 1.6–2.4)
MCH RBC QN AUTO: 31.8 PG (ref 26–34)
MCHC RBC AUTO-ENTMCNC: 32.2 G/DL (ref 32–36)
MCV RBC AUTO: 99 FL (ref 80–100)
NRBC BLD-RTO: 0 /100 WBCS (ref 0–0)
PHOSPHATE SERPL-MCNC: 3.1 MG/DL (ref 2.5–4.9)
PLATELET # BLD AUTO: 123 X10*3/UL (ref 150–450)
POTASSIUM SERPL-SCNC: 4.2 MMOL/L (ref 3.5–5.3)
RBC # BLD AUTO: 4.31 X10*6/UL (ref 4.5–5.9)
SODIUM SERPL-SCNC: 141 MMOL/L (ref 136–145)
TACROLIMUS BLD-MCNC: 5.9 NG/ML
WBC # BLD AUTO: 5.6 X10*3/UL (ref 4.4–11.3)

## 2024-04-01 PROCEDURE — 85027 COMPLETE CBC AUTOMATED: CPT

## 2024-04-01 PROCEDURE — 83735 ASSAY OF MAGNESIUM: CPT

## 2024-04-01 PROCEDURE — 80069 RENAL FUNCTION PANEL: CPT

## 2024-04-01 PROCEDURE — 80197 ASSAY OF TACROLIMUS: CPT

## 2024-04-01 PROCEDURE — 36415 COLL VENOUS BLD VENIPUNCTURE: CPT

## 2024-04-11 PROCEDURE — RXMED WILLOW AMBULATORY MEDICATION CHARGE

## 2024-04-17 ENCOUNTER — SPECIALTY PHARMACY (OUTPATIENT)
Dept: PHARMACY | Facility: CLINIC | Age: 74
End: 2024-04-17

## 2024-04-18 ENCOUNTER — PHARMACY VISIT (OUTPATIENT)
Dept: PHARMACY | Facility: CLINIC | Age: 74
End: 2024-04-18
Payer: MEDICARE

## 2024-05-06 ENCOUNTER — LAB (OUTPATIENT)
Dept: LAB | Facility: LAB | Age: 74
End: 2024-05-06
Payer: MEDICARE

## 2024-05-06 DIAGNOSIS — Z94.0 KIDNEY REPLACED BY TRANSPLANT (HHS-HCC): ICD-10-CM

## 2024-05-06 DIAGNOSIS — E55.9 VITAMIN D DEFICIENCY: ICD-10-CM

## 2024-05-06 DIAGNOSIS — E21.3 HYPERPARATHYROIDISM (MULTI): ICD-10-CM

## 2024-05-06 PROCEDURE — 82570 ASSAY OF URINE CREATININE: CPT

## 2024-05-06 PROCEDURE — 87799 DETECT AGENT NOS DNA QUANT: CPT

## 2024-05-06 PROCEDURE — 84156 ASSAY OF PROTEIN URINE: CPT

## 2024-05-06 PROCEDURE — 83735 ASSAY OF MAGNESIUM: CPT

## 2024-05-06 PROCEDURE — 82306 VITAMIN D 25 HYDROXY: CPT

## 2024-05-06 PROCEDURE — 80197 ASSAY OF TACROLIMUS: CPT

## 2024-05-06 PROCEDURE — 80069 RENAL FUNCTION PANEL: CPT

## 2024-05-06 PROCEDURE — 83970 ASSAY OF PARATHORMONE: CPT

## 2024-05-06 PROCEDURE — 36415 COLL VENOUS BLD VENIPUNCTURE: CPT

## 2024-05-06 PROCEDURE — 85027 COMPLETE CBC AUTOMATED: CPT

## 2024-05-07 LAB
25(OH)D3 SERPL-MCNC: 37 NG/ML (ref 30–100)
ALBUMIN SERPL BCP-MCNC: 3.9 G/DL (ref 3.4–5)
ANION GAP SERPL CALC-SCNC: 14 MMOL/L (ref 10–20)
BKV DNA SERPL NAA+PROBE-LOG#: NORMAL {LOG_COPIES}/ML
BUN SERPL-MCNC: 18 MG/DL (ref 6–23)
CALCIUM SERPL-MCNC: 9.3 MG/DL (ref 8.6–10.6)
CHLORIDE SERPL-SCNC: 102 MMOL/L (ref 98–107)
CO2 SERPL-SCNC: 25 MMOL/L (ref 21–32)
CREAT SERPL-MCNC: 1.36 MG/DL (ref 0.5–1.3)
CREAT UR-MCNC: 84.3 MG/DL (ref 20–370)
EGFRCR SERPLBLD CKD-EPI 2021: 56 ML/MIN/1.73M*2
ERYTHROCYTE [DISTWIDTH] IN BLOOD BY AUTOMATED COUNT: 13.3 % (ref 11.5–14.5)
GLUCOSE SERPL-MCNC: 165 MG/DL (ref 74–99)
HCT VFR BLD AUTO: 41.6 % (ref 41–52)
HGB BLD-MCNC: 13.6 G/DL (ref 13.5–17.5)
LABORATORY COMMENT REPORT: NOT DETECTED
MAGNESIUM SERPL-MCNC: 1.64 MG/DL (ref 1.6–2.4)
MCH RBC QN AUTO: 31.5 PG (ref 26–34)
MCHC RBC AUTO-ENTMCNC: 32.7 G/DL (ref 32–36)
MCV RBC AUTO: 96 FL (ref 80–100)
NRBC BLD-RTO: 0 /100 WBCS (ref 0–0)
PHOSPHATE SERPL-MCNC: 2.7 MG/DL (ref 2.5–4.9)
PLATELET # BLD AUTO: 133 X10*3/UL (ref 150–450)
POTASSIUM SERPL-SCNC: 4 MMOL/L (ref 3.5–5.3)
PROT UR-ACNC: 19 MG/DL (ref 5–25)
PROT/CREAT UR: 0.23 MG/MG CREAT (ref 0–0.17)
PTH-INTACT SERPL-MCNC: 119.4 PG/ML (ref 18.5–88)
RBC # BLD AUTO: 4.32 X10*6/UL (ref 4.5–5.9)
SODIUM SERPL-SCNC: 137 MMOL/L (ref 136–145)
TACROLIMUS BLD-MCNC: 6.1 NG/ML
WBC # BLD AUTO: 6 X10*3/UL (ref 4.4–11.3)

## 2024-05-15 ENCOUNTER — OFFICE VISIT (OUTPATIENT)
Dept: TRANSPLANT | Facility: CLINIC | Age: 74
End: 2024-05-15
Payer: MEDICARE

## 2024-05-15 VITALS
HEART RATE: 58 BPM | OXYGEN SATURATION: 97 % | BODY MASS INDEX: 28.94 KG/M2 | WEIGHT: 196 LBS | TEMPERATURE: 97.9 F | SYSTOLIC BLOOD PRESSURE: 146 MMHG | DIASTOLIC BLOOD PRESSURE: 64 MMHG

## 2024-05-15 DIAGNOSIS — Z94.0 IMMUNOSUPPRESSIVE MANAGEMENT ENCOUNTER FOLLOWING KIDNEY TRANSPLANT (HHS-HCC): ICD-10-CM

## 2024-05-15 DIAGNOSIS — Z94.0 KIDNEY REPLACED BY TRANSPLANT (HHS-HCC): Primary | ICD-10-CM

## 2024-05-15 DIAGNOSIS — B34.8 BK VIREMIA: ICD-10-CM

## 2024-05-15 DIAGNOSIS — Z79.899 IMMUNOSUPPRESSIVE MANAGEMENT ENCOUNTER FOLLOWING KIDNEY TRANSPLANT (HHS-HCC): ICD-10-CM

## 2024-05-15 DIAGNOSIS — I10 ESSENTIAL HYPERTENSION: ICD-10-CM

## 2024-05-15 PROCEDURE — 1126F AMNT PAIN NOTED NONE PRSNT: CPT | Performed by: INTERNAL MEDICINE

## 2024-05-15 PROCEDURE — 99214 OFFICE O/P EST MOD 30 MIN: CPT | Performed by: INTERNAL MEDICINE

## 2024-05-15 PROCEDURE — RXMED WILLOW AMBULATORY MEDICATION CHARGE

## 2024-05-15 PROCEDURE — 1157F ADVNC CARE PLAN IN RCRD: CPT | Performed by: INTERNAL MEDICINE

## 2024-05-15 PROCEDURE — 3077F SYST BP >= 140 MM HG: CPT | Performed by: INTERNAL MEDICINE

## 2024-05-15 PROCEDURE — 3078F DIAST BP <80 MM HG: CPT | Performed by: INTERNAL MEDICINE

## 2024-05-15 RX ORDER — CLONIDINE 0.1 MG/24H
PATCH, EXTENDED RELEASE TRANSDERMAL
Qty: 4 PATCH | Refills: 11 | Status: SHIPPED | OUTPATIENT
Start: 2024-05-15 | End: 2025-05-15

## 2024-05-15 RX ORDER — CHOLECALCIFEROL (VITAMIN D3) 50 MCG
50 TABLET ORAL DAILY
Qty: 90 TABLET | Refills: 3 | Status: SHIPPED | OUTPATIENT
Start: 2024-05-15 | End: 2025-05-15

## 2024-05-15 RX ORDER — PREDNISONE 5 MG/1
5 TABLET ORAL DAILY
Qty: 30 TABLET | Refills: 11 | Status: SHIPPED | OUTPATIENT
Start: 2024-05-15 | End: 2025-05-15

## 2024-05-15 RX ORDER — MULTIVITAMIN
1 TABLET ORAL DAILY
Qty: 90 TABLET | Refills: 3 | Status: SHIPPED | OUTPATIENT
Start: 2024-05-15 | End: 2025-05-15

## 2024-05-15 RX ORDER — ASPIRIN 81 MG/1
81 TABLET ORAL DAILY
Qty: 90 TABLET | Refills: 3 | Status: SHIPPED | OUTPATIENT
Start: 2024-05-15 | End: 2025-05-15

## 2024-05-15 RX ORDER — LOSARTAN POTASSIUM 25 MG/1
25 TABLET ORAL 2 TIMES DAILY
Qty: 60 TABLET | Refills: 11 | Status: SHIPPED | OUTPATIENT
Start: 2024-05-15 | End: 2025-05-15

## 2024-05-15 RX ORDER — MYCOPHENOLATE MOFETIL 250 MG/1
250 CAPSULE ORAL 2 TIMES DAILY
Qty: 60 CAPSULE | Refills: 11 | Status: SHIPPED | OUTPATIENT
Start: 2024-05-15 | End: 2025-05-15

## 2024-05-15 RX ORDER — FUROSEMIDE 40 MG/1
40 TABLET ORAL DAILY
Qty: 30 TABLET | Refills: 11 | Status: SHIPPED | OUTPATIENT
Start: 2024-05-15 | End: 2025-05-15

## 2024-05-15 ASSESSMENT — PAIN SCALES - GENERAL: PAINLEVEL: 0-NO PAIN

## 2024-05-15 NOTE — PATIENT INSTRUCTIONS
Start vitamin D 2000 unit once a day  Please take Envarsus at 9 am and do lab at 8-9 am before morning dose.  Continue blood test every other month, starting in June  Resume mycophenolate 250 mg twice a day  Next visit in 4 months

## 2024-05-15 NOTE — PROGRESS NOTES
TRANSPLANT NEPHROLOGY :   OUTPATIENT CLINIC NOTE      SERVICE DATE : 05/15/2024    REASON FOR VISIT/CHIEF COMPLAINT:  S/P  TRANSPLANT SURGERY  IMMUNOSUPPRESSIVE MEDICATION MANAGEMENT  BLOOD PRESSURE MANAGEMENT    HPI:    Mr. Hayes is a 73 y.o. male with past medical history significant for end stage renal disease secondary to hypertensive nephropathy status post living donor kidney transplant from his wife on 2002 with graft failure on 2014. He then underwent a  donor kidney transplant on 2020.      Patient is here to follow up S/P kidney transplant surgery.    Covid 2-3/2024; swelling from prednisone. He had to take lasix.     Patient is doing well overall. No new complaints. Denied chest pain, SOB, BARRERA, Palpitation. Normal urination and bowel movement. Normal gait and no weakness of arms/legs. No cough, runny nose, sore throat, cold symptoms, or rash. No hearing loss. Normal vision.No problems with his sleep, mood and function. No recent infection, hospitalization, surgery or ER visits.      ROS:  Review of  14 systems was performed system by system. See HPI. Otherwise, the symptoms were negative.    PAST MEDICAL HISTORY:  Past Medical History:   Diagnosis Date    Chronic obstructive pulmonary disease, unspecified ([facility]) 2014    Chronic obstructive pulmonary disease    Elevation of levels of liver transaminase levels     ALT (SGPT) level raised    Encounter for other preprocedural examination 2019    Pre-transplant evaluation for kidney transplant    Encounter for other preprocedural examination 2020    Pre-transplant evaluation for ESRD (end stage renal disease)    End stage renal disease ([facility]) 2020    End stage renal disease    Essential (primary) hypertension 2022    Hypertension, unspecified type    Hepatic fibrosis, unspecified 10/01/2015    Hepatic fibrosis    Localized edema 2020    Edema of right lower extremity    Noninfective  gastroenteritis and colitis, unspecified     Colitis    Other conditions influencing health status 05/08/2014    Coronary Artery Disease    Other specified abnormal findings of blood chemistry     Abnormal liver function test    Personal history of diseases of the blood and blood-forming organs and certain disorders involving the immune mechanism     History of anemia    Personal history of other diseases of the circulatory system 02/24/2017    History of angina pectoris    Personal history of other diseases of the circulatory system 02/24/2017    History of congestive heart disease    Personal history of other diseases of the digestive system     History of gastrointestinal hemorrhage    Personal history of other endocrine, nutritional and metabolic disease 11/24/2015    History of hyperlipidemia    Personal history of other endocrine, nutritional and metabolic disease 09/21/2020    History of hypercalcemia    Personal history of other infectious and parasitic diseases 07/26/2013    History of pseudomembranous enterocolitis    Unspecified complication of kidney transplant ([facility]) 08/14/2015    Renal transplant disorder        PAST SURGICAL HISTORY:  Past Surgical History:   Procedure Laterality Date    CORONARY ANGIOPLASTY WITH STENT PLACEMENT  10/01/2015    Cath Stent Placement    CORONARY ANGIOPLASTY WITH STENT PLACEMENT  10/01/2015    Cath Placement Of Stent 1    CORONARY ARTERY BYPASS GRAFT  10/01/2015    Coronary Artery Surgery    CT ABDOMEN PELVIS ANGIOGRAM W AND/OR WO IV CONTRAST  8/8/2019    CT ABDOMEN PELVIS ANGIOGRAM W AND/OR WO IV CONTRAST 8/8/2019 [facility] [place] ANCILLARY LEGACY    CT ABDOMEN PELVIS ANGIOGRAM W AND/OR WO IV CONTRAST  11/6/2019    CT ABDOMEN PELVIS ANGIOGRAM W AND/OR WO IV CONTRAST 11/6/2019 [facility] [place] ANCILLARY LEGACY    CT ABDOMEN PELVIS ANGIOGRAM W AND/OR WO IV CONTRAST  1/9/2020    CT ABDOMEN PELVIS ANGIOGRAM W AND/OR WO IV CONTRAST 1/9/2020 [facility] [place] ANCILLARY LEGACY    CT ABDOMEN PELVIS ANGIOGRAM W  AND/OR WO IV CONTRAST  2/27/2020    CT ABDOMEN PELVIS ANGIOGRAM W AND/OR WO IV CONTRAST 2/27/2020 [facility] [place]    CT ABDOMEN PELVIS ANGIOGRAM W AND/OR WO IV CONTRAST  12/1/2016    CT ABDOMEN PELVIS ANGIOGRAM W AND/OR WO IV CONTRAST 12/1/2016 [facility] ANCILLARY LEGACY    HERNIA REPAIR  10/01/2015    Hernia Repair    IR EMBOLIZATION LYMPH NODE Bilateral 10/21/2019    IR EMBOLIZATION LYMPH NODE 10/21/2019 [facility] [place] [facility]    IR INTERVENTION VENOUS ARTERIAL PTA  2/4/2020    IR INTERVENTION VENOUS ARTERIAL PTA 2/4/2020 [facility] [place] [facility]    OTHER SURGICAL HISTORY  12/17/2013    Sigmoidoscopy (Fiberop) W/ Directed Submucosal Injection(S)    OTHER SURGICAL HISTORY  10/01/2015    Arteriovenous Surgery Creation Of A-V Fistula    OTHER SURGICAL HISTORY  10/01/2015    Hemodialysis Access Type Catheter 2 Lumen    OTHER SURGICAL HISTORY  07/06/2020    Renal Transplant        SOCIAL HISTORY:  Social History     Socioeconomic History    Marital status:      Spouse name: Not on file    Number of children: Not on file    Years of education: Not on file    Highest education level: Not on file   Occupational History    Not on file   Tobacco Use    Smoking status: Not on file    Smokeless tobacco: Not on file   Substance and Sexual Activity    Alcohol use: Not on file    Drug use: Not on file    Sexual activity: Not on file   Other Topics Concern    Not on file   Social History Narrative    Not on file     Social Determinants of Health     Financial Resource Strain: Not on file   Food Insecurity: Not on file   Transportation Needs: Not on file   Physical Activity: Not on file   Stress: Not on file   Social Connections: Not on file   Intimate Partner Violence: Not on file   Housing Stability: Not on file       FAMILY HISTORY:  No family history on file.    MEDICATION LIST:  Current Outpatient Medications   Medication Instructions    Envarsus XR 4 mg, oral, Daily    tacrolimus ER (Envarsus XR) 1 mg tablet ER Take 3 tablets (3 mg)  by mouth once daily. Take 3 tablets daily with 4mg tablet for total of 7 mg daily.       ALLERGY  Not on File    PHYSICAL EXAM:    Visit Vitals  /64   Pulse 58   Temp 36.6 °C (97.9 °F) (Temporal)   Wt 88.9 kg (196 lb)   SpO2 97%   BMI 28.94 kg/m²   BSA 2.08 m²          Vital signs - reviewed. Acceptable BP at this office visit.   General Appearance - NAD, Good speech, oriented and alert  HEENT - Supple. Not pale. No jaundice. No cervical lymphadenopathy. Pharynx and tonsils are not injected.  CVS - RRR. Normal S1/S2. No murmur, click , rub or gallop  Lungs- clear to auscultation bilaterally  Abdomen - soft , not tender, no guarding, no rigidity. No hepatosplenomegaly. Normal bowel sounds. No masses and ascites. S/P Kidney transplant .  Transplanted kidney is not tender.   Musculoskeletal /Extremities - no edema. Full ROM. No joint tenderness.   Neuro/Psych - appropriate mood and affect. Motor power V/V all extremities. CN I -XII were grossly intact.  Skin - No visible rash      LABS:    Lab Results   Component Value Date    WBC 6.0 2024    HGB 13.6 2024    HCT 41.6 2024     (L) 2024    ALT 16 2020    AST 15 2020     2024    K 4.0 2024     2024    CREATININE 1.36 (H) 2024    BUN 18 2024    CO2 25 2024    TSH 0.47 2020    INR 1.1 2021    HGBA1C 4.6 2019     par    ASSESSMENT AND PLAN:    Mr. Hayes is a 73 y.o. male  who is here for follow up s/p kidney transplant.    TRANSPLANT DATE: 2020 (Kidney), 2002 (Kidney)  Primary Cause of Organ Disease: Hypertensive Nephrosclerosis.   Donor/Transplant Type: A  donor renal transplant on: 2020,~A living donor renal transplant on: 2002 with graft failure 2014.   Transplant Course: standard thymoglobulin induction,~No DGF,~No CMV mismatch,~No EBV mismatch,~KDPI: 46%~and~PRA: 0.   Rejections: No episodes of rejection.   Infection: Other:  fistula infection (8/2018), groin fungal infection (4/2012), UTI, BKV.  COVID 2/2024  Malignancies After Transplant: No episodes of post transplant malignancy.     1. ESRD S/P kidney transplant   - Creatinine last check was :  Lab Results   Component Value Date    CREATININE 1.36 (H) 05/06/2024     Creatinine clearance cannot be calculated (Patient's most recent lab result is older than the maximum 7 days allowed.)    - Renal allograft function is STABLE, Baseline 1.3-1.6  -Random urine protein/creatinine ratio is 0.2  -Allosure last check was normal   -Ensure adequate hydration  - Avoid nephrotoxic medications, NSAIDs, and IV contrast.    2. Immunosuppression  -Tacrolimus level last check was 14 hour - 6  -Continue current immunosuppression regimen.    3. Electrolytes  Lab Results   Component Value Date    GLUCOSE 165 (H) 05/06/2024    CALCIUM 9.3 05/06/2024     05/06/2024    K 4.0 05/06/2024    CO2 25 05/06/2024     05/06/2024    BUN 18 05/06/2024    CREATININE 1.36 (H) 05/06/2024     -Acceptable from last lab drawn    4. Hypertension  Blood Pressures         5/15/2024  1057             BP: 146/64          -Home  BP had been acceptable  -Encourage to monitor home BP  -Continue current anti hypertensive medication    5. Bone Mineral Disease/Osteoporosis  Lab Results   Component Value Date    .4 (H) 05/06/2024    CALCIUM 9.3 05/06/2024    PHOS 2.7 05/06/2024    VITD25 37 05/06/2024     -check VIT D, PTH with next lab  - Consider DEXA every 2-3 years , defer to PCP    6.Anemia  Lab Results   Component Value Date    WBC 6.0 05/06/2024    HGB 13.6 05/06/2024    HCT 41.6 05/06/2024    MCV 96 05/06/2024     (L) 05/06/2024     -asymptomatic  - Continue to monitor  -check iron studies and ferritin. Will consider CELENA as needed.  - No indications for blood transfusion     7.Health maintenance and vaccination  - Flu shot during flu season annually  - Cancer screening is up to date per the  patient    Lab : Routine transplant lab ( CBC, RFP, and anti-rejection trough level ) every 2 months  Additional labs:  VIT D, PTH with next lab  BK PCR PLASMA and UPC per protocol.    Additional Plan :  Allosure q 3 months    RTC 4   month(s)    De Dodson    Transplant Nephrology

## 2024-05-20 ENCOUNTER — SPECIALTY PHARMACY (OUTPATIENT)
Dept: PHARMACY | Facility: CLINIC | Age: 74
End: 2024-05-20

## 2024-05-22 ENCOUNTER — PHARMACY VISIT (OUTPATIENT)
Dept: PHARMACY | Facility: CLINIC | Age: 74
End: 2024-05-22
Payer: MEDICARE

## 2024-05-22 ENCOUNTER — TELEPHONE (OUTPATIENT)
Dept: TRANSPLANT | Facility: HOSPITAL | Age: 74
End: 2024-05-22
Payer: MEDICARE

## 2024-05-22 NOTE — TELEPHONE ENCOUNTER
Patient lvm, he saw Dr. Dodson last week, she adjusted his meds. Tried Vit. D, now has diarrhea and a bloated stomach. He was stopped taking it. Envarus was increased. TAC was upped.  He didn't clarify what the meds were increased to, clearly. 12 hr TAC was sent in, but he does not take that. Did not receive his MMF, was suppose to receive that from  Pharm. Feet are swollen as well

## 2024-05-22 NOTE — TELEPHONE ENCOUNTER
Returned phone call to the patient. States the Vitamin D was making him bloated and he started having diarrhea. He stopped taking Vitamin D. Confirmed that he is taking Envarsus 7 mg once daily. Does not need a refill for Envarsus. He did not receive the mycophenolate 250 mg twice a day. [facility] with 11/11 refills remaining. Will message  [facility] regarding status of delivery.

## 2024-05-23 ENCOUNTER — OFFICE VISIT (OUTPATIENT)
Dept: VASCULAR SURGERY | Facility: CLINIC | Age: 74
End: 2024-05-23
Payer: MEDICARE

## 2024-05-23 VITALS
HEART RATE: 76 BPM | DIASTOLIC BLOOD PRESSURE: 74 MMHG | HEIGHT: 66 IN | BODY MASS INDEX: 30.68 KG/M2 | SYSTOLIC BLOOD PRESSURE: 150 MMHG | OXYGEN SATURATION: 96 % | WEIGHT: 190.9 LBS

## 2024-05-23 DIAGNOSIS — I73.9 CLAUDICATION (CMS-HCC): Primary | ICD-10-CM

## 2024-05-23 DIAGNOSIS — I72.3 ILIAC ANEURYSM (CMS-HCC): ICD-10-CM

## 2024-05-23 DIAGNOSIS — I72.3 ILIAC ARTERY ANEURYSM, LEFT (CMS-HCC): ICD-10-CM

## 2024-05-23 DIAGNOSIS — I73.9 PAD (PERIPHERAL ARTERY DISEASE) (CMS-HCC): ICD-10-CM

## 2024-05-23 PROCEDURE — 1126F AMNT PAIN NOTED NONE PRSNT: CPT | Performed by: SURGERY

## 2024-05-23 PROCEDURE — 1157F ADVNC CARE PLAN IN RCRD: CPT | Performed by: SURGERY

## 2024-05-23 PROCEDURE — 99214 OFFICE O/P EST MOD 30 MIN: CPT | Performed by: SURGERY

## 2024-05-23 PROCEDURE — 1159F MED LIST DOCD IN RCRD: CPT | Performed by: SURGERY

## 2024-05-23 RX ORDER — PANTOPRAZOLE SODIUM 40 MG/1
40 TABLET, DELAYED RELEASE ORAL DAILY
COMMUNITY

## 2024-05-23 RX ORDER — NITROGLYCERIN 0.4 MG/1
TABLET SUBLINGUAL
COMMUNITY

## 2024-05-23 RX ORDER — ATORVASTATIN CALCIUM 40 MG/1
40 TABLET, FILM COATED ORAL DAILY
COMMUNITY

## 2024-05-23 RX ORDER — METOPROLOL TARTRATE 50 MG/1
50 TABLET ORAL 2 TIMES DAILY
COMMUNITY

## 2024-05-23 RX ORDER — APIXABAN 2.5 MG/1
2.5 TABLET, FILM COATED ORAL 2 TIMES DAILY
COMMUNITY

## 2024-05-23 RX ORDER — FLUTICASONE PROPIONATE AND SALMETEROL 250; 50 UG/1; UG/1
POWDER RESPIRATORY (INHALATION)
COMMUNITY

## 2024-05-23 RX ORDER — ALBUTEROL SULFATE 0.83 MG/ML
SOLUTION RESPIRATORY (INHALATION)
COMMUNITY
Start: 2017-01-13

## 2024-05-23 RX ORDER — IPRATROPIUM BROMIDE AND ALBUTEROL SULFATE 2.5; .5 MG/3ML; MG/3ML
SOLUTION RESPIRATORY (INHALATION)
COMMUNITY

## 2024-05-23 ASSESSMENT — ENCOUNTER SYMPTOMS
OCCASIONAL FEELINGS OF UNSTEADINESS: 0
LOSS OF SENSATION IN FEET: 0
DEPRESSION: 0

## 2024-05-23 ASSESSMENT — PAIN SCALES - GENERAL: PAINLEVEL: 0-NO PAIN

## 2024-05-23 NOTE — PROGRESS NOTES
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    States that when he lays down has some right thigh pain -mostly anterior thigh.  No sharp shooting pain down the back of the leg.  Walking is ok   When not laying down no pain  Had Covid 4 mos ago.  Recovering and overall doing okay from that.  He was hospitalized and appears to have lost a bit of strength since then.    Meds:   Current Outpatient Medications:     albuterol 2.5 mg /3 mL (0.083 %) nebulizer solution, Inhale., Disp: , Rfl:     aspirin 81 mg EC tablet, Take 1 tablet (81 mg) by mouth once daily., Disp: 90 tablet, Rfl: 3    atorvastatin (Lipitor) 40 mg tablet, Take 1 tablet (40 mg) by mouth once daily., Disp: , Rfl:     calcium carbonate-vitamin D3 (Calcium 600 + D,3,) 600 mg-10 mcg (400 unit) tablet, Take 1 tablet by mouth once daily., Disp: 90 tablet, Rfl: 3    cloNIDine (Catapres-TTS-1) 0.1 mg/24 hr patch, Apply one patch on the skin and replace every 7 days, as directed, Disp: 4 patch, Rfl: 11    Eliquis 2.5 mg tablet, Take 1 tablet (2.5 mg) by mouth 2 times a day., Disp: , Rfl:     fluticasone propion-salmeteroL (Advair Diskus) 250-50 mcg/dose diskus inhaler, INHALE ONE PUFF BY MOUTH TWO TIMES DAILY, Disp: , Rfl:     furosemide (Lasix) 40 mg tablet, Take 1 tablet (40 mg) by mouth once daily., Disp: 30 tablet, Rfl: 11    ipratropium-albuteroL (Duo-Neb) 0.5-2.5 mg/3 mL nebulizer solution, INHALE CONTENTS OF ONE VIAL VIA NEBULIZER THREE TIMES DAILY, Disp: , Rfl:     losartan (Cozaar) 25 mg tablet, Take 1 tablet (25 mg) by mouth 2 times a day., Disp: 60 tablet, Rfl: 11    metoprolol tartrate (Lopressor) 50 mg tablet, Take 1 tablet by mouth 2 times a day., Disp: , Rfl:     mycophenolate (Cellcept) 250 mg capsule, Take 1 capsule (250 mg) by mouth 2 times a day.,  Disp: 60 capsule, Rfl: 11    nitroglycerin (Nitrostat) 0.4 mg SL tablet, PLACE 1 TABLET UNDER THE TOUNGE EVERY 5 MINUTES AS NEEDED FOR CHEST PAIN  NOT TO EXCEED 3 DOSES, Disp: , Rfl:     pantoprazole (ProtoNix) 40 mg EC tablet, Take 1 tablet (40 mg) by mouth once daily., Disp: , Rfl:     predniSONE (Deltasone) 5 mg tablet, Take 1 tablet (5 mg) by mouth once daily., Disp: 30 tablet, Rfl: 11    tacrolimus ER (Envarsus XR) 1 mg tablet ER, Take 2 tablets (2 mg) by mouth once daily. Total dose 6 mg once a day, Disp: 60 tablet, Rfl: 11    tacrolimus ER (Envarsus XR) 4 mg tablet ER, Take 1 tablet (4 mg) by mouth once daily., Disp: 30 tablet, Rfl: 11    cholecalciferol (Vitamin D-3) 50 MCG (2000 UT) tablet, Take 1 tablet (50 mcg) by mouth once daily. (Patient not taking: Reported on 5/23/2024), Disp: 90 tablet, Rfl: 3    Allergies:   Allergies   Allergen Reactions    Terbinafine Itching    Niacin Itching       ROS:  Review of Systems    otherwise unremarkable    Objective:  Vitals:  Vitals:    05/23/24 1141   BP: 150/74   Pulse: 76   SpO2: 96%        Exam:  No distress  Breathing comfortably   Not tachycardic  Abd soft, nt, nd  Palpable femoral pulses  B/l legs with intact pulses on right, non-palp on left (baseline), well perfused feet  Minimally cool b/l  B/l pedal/ankle edema      Labs:  Lab Results   Component Value Date    WBC 6.0 05/06/2024    WBC 5.6 04/01/2024    WBC 5.2 02/05/2024    HGB 13.6 05/06/2024    HGB 13.7 04/01/2024    HGB 13.5 02/05/2024    HCT 41.6 05/06/2024    HCT 42.5 04/01/2024    HCT 42.2 02/05/2024    MCV 96 05/06/2024    MCV 99 04/01/2024     02/05/2024     (L) 05/06/2024     Lab Results   Component Value Date    CREATININE 1.36 (H) 05/06/2024    CREATININE 1.42 (H) 04/01/2024    CREATININE 1.43 (H) 02/05/2024    BUN 18 05/06/2024    BUN 15 04/01/2024    BUN 18 02/05/2024     05/06/2024     04/01/2024     02/05/2024    K 4.0 05/06/2024    K 4.2 04/01/2024    K 3.8  2024     2024     2024     2024    CO2 25 2024    CO2 28 2024    CO2 29 2024         Imaging:  Vascular lab imaging reviewed.  Good flow through the iliacs.  CT scan also reviewed that was noncontrast.  On my evaluation of the left hypogastric aneurysm, it appears to have grown 1 and at most 2 mm.  The right side appears stable.    Assessment & Plan:  Abel Hayes is 73 y.o. male with history of b/l hypoastric aneurysms, asymptomatic  2016 v 2019 CTA were compared his right hypogastric aneurysm has grown by ~4mm and his left hypogastric aneurysm appears to be relatively unchanged.      complicated medical history including ESRD on HD secondary renal cell carcinoma s/p radical b/l nephrectomy  h/o right pelvic renal transplant - nonfunctional, on prednisone 5mg daily  h/o ventral hernia repair with synthetic mesh  Jehova's witness  h/o multiple PCI leading to CABG 4V requiring 1 stent post in , on aspirin,  h/o PD catheter dialysis     per discussion with patient, appears that he is currently on hold for the transplant list secondary to his hypogastric aneurysms. he would tentatively get a kidney transplant ( donor) on the left.   slightly larger hypogastric aneurysm is on the right.      S/P 2019  OPERATION/PROCEDURE:  1. Ultrasound-guided access to the right common femoral artery.  2. Aortography with catheter placement into the aorta with multiple views, selective, for the right pelvis and the left pelvis.     OPERATION/PROCEDURE: 10/21/19  1. Left high brachial artery cutdown for retrograde access and aortography with special right pelvic views.  2. Final catheter placement in the second order right hypogastric artery branches.  3. Right second and third order hypogastric artery coil embolization with 6 Ana, and another branch with an 8 and a 10 Ana coil embolization.  4. Proximal coil embolization of the origin of the right  hypogastric artery with an Amplatzer vascular plug II 16 mm  diameter.  5. Primary repair of left brachial artery     CTA with continued endoleak     PROCEDURE ON 2/3/20:  OPERATION/PROCEDURE:  1. Ultrasound-guided access to the right common femoral artery for aortography and right lower extremity angiography as well as third-order plus selective catheterization of the right hypogastric artery anterior and posterior branches.  2. Coil embolization of the outflow branches of the third-order hypogastric artery branches with a total of 8 Concerto coils (three 4 mm coils, three 6 mm coils, and two 9 mm coils).  3. Right common iliac and external iliac artery stenting with a [place] Viabahn VBX balloon expandable stent, 11 x 79 mm long (distal stent dilated to 11 mm, the mid stent dilated to 16 mm, and the proximal stent to 14 mm).  4. ProGlide closure, right common femoral artery.     No further evidence of endoleak with good result from reintervention; right CHANTEL/EIA stent with good apposition and perfusion to foot.     06/18/2020 underwent kidney transplant into the left pelvis with iliac inflow and outflow.     Overall he is doing well from an pelvic hypogastric aneurysm with good closure on the right and stenting of the iliac artery and overall stable left hypogastric aneurysm size.  Renal function remained stable with transplant.    1) I will see you back in summer of 2025 with an ultrasound and ct scan of your aneurysms and stent  2) continue your medication as you are doing    Parviz Rm MD, MHS, RPVI  , [facility] [employer]  Director, Center [facility] Venous [facility], [facility] [place]  Co-Director, Vascular Laboratories, [facility] [place]  Division of Vascular Surgery and Endovascular Therapy  [facility]

## 2024-05-23 NOTE — PATIENT INSTRUCTIONS
It was a pleasure taking care of you today and appreciate your seeing us at our [facility].     Today's plan is as follows:  1) I will see you back in summer of 2025 with an ultrasound and ct scan of your aneurysms and stent  2) continue your medication as you are doing      Please call the office with any questions at [phone].   You can speak to our secretaries or our clinical nurses for specific questions.   For [facility] specific questions, you can also call [phone] or email at [email or web address]  If you need coordinating your appointments and testing you can do these at the  or by calling my office shortly after your visit.

## 2024-06-03 ENCOUNTER — SPECIALTY PHARMACY (OUTPATIENT)
Dept: PHARMACY | Facility: CLINIC | Age: 74
End: 2024-06-03

## 2024-06-03 ENCOUNTER — LAB (OUTPATIENT)
Dept: LAB | Facility: LAB | Age: 74
End: 2024-06-03
Payer: MEDICARE

## 2024-06-03 DIAGNOSIS — Z94.0 KIDNEY REPLACED BY TRANSPLANT (HHS-HCC): ICD-10-CM

## 2024-06-03 DIAGNOSIS — E21.3 HYPERPARATHYROIDISM (MULTI): ICD-10-CM

## 2024-06-03 DIAGNOSIS — E55.9 VITAMIN D DEFICIENCY: ICD-10-CM

## 2024-06-03 LAB
25(OH)D3 SERPL-MCNC: 42 NG/ML (ref 30–100)
ALBUMIN SERPL BCP-MCNC: 3.8 G/DL (ref 3.4–5)
ANION GAP SERPL CALC-SCNC: 13 MMOL/L (ref 10–20)
BUN SERPL-MCNC: 22 MG/DL (ref 6–23)
CALCIUM SERPL-MCNC: 9.2 MG/DL (ref 8.6–10.6)
CHLORIDE SERPL-SCNC: 100 MMOL/L (ref 98–107)
CO2 SERPL-SCNC: 26 MMOL/L (ref 21–32)
CREAT SERPL-MCNC: 1.47 MG/DL (ref 0.5–1.3)
EGFRCR SERPLBLD CKD-EPI 2021: 51 ML/MIN/1.73M*2
ERYTHROCYTE [DISTWIDTH] IN BLOOD BY AUTOMATED COUNT: 13.4 % (ref 11.5–14.5)
GLUCOSE SERPL-MCNC: 172 MG/DL (ref 74–99)
HCT VFR BLD AUTO: 38.6 % (ref 41–52)
HGB BLD-MCNC: 12.7 G/DL (ref 13.5–17.5)
MAGNESIUM SERPL-MCNC: 1.46 MG/DL (ref 1.6–2.4)
MCH RBC QN AUTO: 31.7 PG (ref 26–34)
MCHC RBC AUTO-ENTMCNC: 32.9 G/DL (ref 32–36)
MCV RBC AUTO: 96 FL (ref 80–100)
NRBC BLD-RTO: 0 /100 WBCS (ref 0–0)
PHOSPHATE SERPL-MCNC: 2.1 MG/DL (ref 2.5–4.9)
PLATELET # BLD AUTO: 127 X10*3/UL (ref 150–450)
POTASSIUM SERPL-SCNC: 3.7 MMOL/L (ref 3.5–5.3)
PTH-INTACT SERPL-MCNC: 112.7 PG/ML (ref 18.5–88)
RBC # BLD AUTO: 4.01 X10*6/UL (ref 4.5–5.9)
SODIUM SERPL-SCNC: 135 MMOL/L (ref 136–145)
TACROLIMUS BLD-MCNC: 7.8 NG/ML
WBC # BLD AUTO: 4.7 X10*3/UL (ref 4.4–11.3)

## 2024-06-03 PROCEDURE — 87799 DETECT AGENT NOS DNA QUANT: CPT

## 2024-06-03 PROCEDURE — 83735 ASSAY OF MAGNESIUM: CPT

## 2024-06-03 PROCEDURE — 36415 COLL VENOUS BLD VENIPUNCTURE: CPT

## 2024-06-03 PROCEDURE — 83970 ASSAY OF PARATHORMONE: CPT

## 2024-06-03 PROCEDURE — 85027 COMPLETE CBC AUTOMATED: CPT

## 2024-06-03 PROCEDURE — 80069 RENAL FUNCTION PANEL: CPT

## 2024-06-03 PROCEDURE — RXMED WILLOW AMBULATORY MEDICATION CHARGE

## 2024-06-03 PROCEDURE — 82306 VITAMIN D 25 HYDROXY: CPT

## 2024-06-03 PROCEDURE — 80197 ASSAY OF TACROLIMUS: CPT

## 2024-06-04 LAB
BKV DNA SERPL NAA+PROBE-LOG#: NORMAL {LOG_COPIES}/ML
LABORATORY COMMENT REPORT: NOT DETECTED

## 2024-06-05 LAB
ALLOSURE SCORE - KIDNEY: 0.18 %
CAREDX_ORDER_ID: NORMAL
CENTER_ORDER_ID: NORMAL
CLIENT SPECIMEN ID - ALLOSURE: NORMAL
DONOR RELATION - ALLOSURE: NORMAL
NOTES - ALLOSURE: NORMAL
RELATIVE CHANGE VALUE - KIDNEY: NORMAL %
TEST COMMENTS - ALLOSURE: NORMAL
TIME POST TX - ALLOSURE: NORMAL
TRANSPLANTED ORGAN - ALLOSURE: NORMAL
TX DATE - ALLOSURE/ALLOMAP: NORMAL
WP_ORDER_ID: NORMAL

## 2024-06-06 PROCEDURE — RXMED WILLOW AMBULATORY MEDICATION CHARGE

## 2024-06-14 PROCEDURE — RXMED WILLOW AMBULATORY MEDICATION CHARGE

## 2024-06-15 ENCOUNTER — PHARMACY VISIT (OUTPATIENT)
Dept: PHARMACY | Facility: CLINIC | Age: 74
End: 2024-06-15
Payer: COMMERCIAL

## 2024-06-27 ENCOUNTER — SPECIALTY PHARMACY (OUTPATIENT)
Dept: PHARMACY | Facility: CLINIC | Age: 74
End: 2024-06-27

## 2024-07-17 PROCEDURE — RXMED WILLOW AMBULATORY MEDICATION CHARGE

## 2024-07-18 ENCOUNTER — PHARMACY VISIT (OUTPATIENT)
Dept: PHARMACY | Facility: CLINIC | Age: 74
End: 2024-07-18
Payer: COMMERCIAL

## 2024-08-05 ENCOUNTER — LAB (OUTPATIENT)
Dept: LAB | Facility: LAB | Age: 74
End: 2024-08-05
Payer: MEDICARE

## 2024-08-05 DIAGNOSIS — E21.3 HYPERPARATHYROIDISM (MULTI): ICD-10-CM

## 2024-08-05 DIAGNOSIS — E55.9 VITAMIN D DEFICIENCY: ICD-10-CM

## 2024-08-05 DIAGNOSIS — Z94.0 KIDNEY REPLACED BY TRANSPLANT (HHS-HCC): ICD-10-CM

## 2024-08-05 LAB
25(OH)D3 SERPL-MCNC: 45 NG/ML (ref 30–100)
ALBUMIN SERPL BCP-MCNC: 3.9 G/DL (ref 3.4–5)
ANION GAP SERPL CALC-SCNC: 13 MMOL/L (ref 10–20)
BUN SERPL-MCNC: 21 MG/DL (ref 6–23)
CALCIUM SERPL-MCNC: 9.6 MG/DL (ref 8.6–10.6)
CHLORIDE SERPL-SCNC: 105 MMOL/L (ref 98–107)
CO2 SERPL-SCNC: 27 MMOL/L (ref 21–32)
CREAT SERPL-MCNC: 1.61 MG/DL (ref 0.5–1.3)
CREAT UR-MCNC: 183.6 MG/DL (ref 20–370)
EGFRCR SERPLBLD CKD-EPI 2021: 45 ML/MIN/1.73M*2
ERYTHROCYTE [DISTWIDTH] IN BLOOD BY AUTOMATED COUNT: 14 % (ref 11.5–14.5)
GLUCOSE SERPL-MCNC: 135 MG/DL (ref 74–99)
HCT VFR BLD AUTO: 39.5 % (ref 41–52)
HGB BLD-MCNC: 12.9 G/DL (ref 13.5–17.5)
MAGNESIUM SERPL-MCNC: 1.73 MG/DL (ref 1.6–2.4)
MCH RBC QN AUTO: 31.5 PG (ref 26–34)
MCHC RBC AUTO-ENTMCNC: 32.7 G/DL (ref 32–36)
MCV RBC AUTO: 96 FL (ref 80–100)
NRBC BLD-RTO: 0 /100 WBCS (ref 0–0)
PHOSPHATE SERPL-MCNC: 2.6 MG/DL (ref 2.5–4.9)
PLATELET # BLD AUTO: 111 X10*3/UL (ref 150–450)
POTASSIUM SERPL-SCNC: 4 MMOL/L (ref 3.5–5.3)
PROT UR-ACNC: 11 MG/DL (ref 5–25)
PROT/CREAT UR: 0.06 MG/MG CREAT (ref 0–0.17)
PTH-INTACT SERPL-MCNC: 101.5 PG/ML (ref 18.5–88)
RBC # BLD AUTO: 4.1 X10*6/UL (ref 4.5–5.9)
SODIUM SERPL-SCNC: 141 MMOL/L (ref 136–145)
TACROLIMUS BLD-MCNC: 7.3 NG/ML
WBC # BLD AUTO: 5.2 X10*3/UL (ref 4.4–11.3)

## 2024-08-05 PROCEDURE — 82570 ASSAY OF URINE CREATININE: CPT

## 2024-08-05 PROCEDURE — 83970 ASSAY OF PARATHORMONE: CPT

## 2024-08-05 PROCEDURE — 83735 ASSAY OF MAGNESIUM: CPT

## 2024-08-05 PROCEDURE — 36415 COLL VENOUS BLD VENIPUNCTURE: CPT

## 2024-08-05 PROCEDURE — 84156 ASSAY OF PROTEIN URINE: CPT

## 2024-08-05 PROCEDURE — 87799 DETECT AGENT NOS DNA QUANT: CPT

## 2024-08-05 PROCEDURE — 80197 ASSAY OF TACROLIMUS: CPT

## 2024-08-05 PROCEDURE — 85027 COMPLETE CBC AUTOMATED: CPT

## 2024-08-05 PROCEDURE — 82306 VITAMIN D 25 HYDROXY: CPT

## 2024-08-05 PROCEDURE — 80069 RENAL FUNCTION PANEL: CPT

## 2024-08-06 DIAGNOSIS — Z94.0 KIDNEY REPLACED BY TRANSPLANT (HHS-HCC): ICD-10-CM

## 2024-08-06 LAB
BKV DNA SERPL NAA+PROBE-ACNC: 31 IU/ML
BKV DNA SERPL NAA+PROBE-LOG#: 1.49 LOG IU/ML
LABORATORY COMMENT REPORT: DETECTED

## 2024-08-09 ENCOUNTER — TELEPHONE (OUTPATIENT)
Dept: TRANSPLANT | Facility: HOSPITAL | Age: 74
End: 2024-08-09
Payer: MEDICARE

## 2024-08-09 DIAGNOSIS — Z94.0 KIDNEY REPLACED BY TRANSPLANT (HHS-HCC): ICD-10-CM

## 2024-08-09 NOTE — TELEPHONE ENCOUNTER
Reviewed 8/5 labs with Dr. Austin:     Creatinine: (1.61 on 8/5); (1.47); (1.36); (1.42)   WBC: (5.2 on 8/5); (4.7); (6.0); (5.6)   Tac: (7.3 on 8/5)---at goal   BK: (31 on 8/5); (non-detected 9/5/23); (27 on 7/31/23)     mg BID   Prednisone 5 mg     Plan: Hydrate and repeat labs next week. Called and left a voicemail regarding repeating labs. Asked for a call back if he had any further questions.

## 2024-08-12 ENCOUNTER — LAB (OUTPATIENT)
Dept: LAB | Facility: LAB | Age: 74
End: 2024-08-12
Payer: MEDICARE

## 2024-08-12 DIAGNOSIS — Z94.0 KIDNEY REPLACED BY TRANSPLANT (HHS-HCC): ICD-10-CM

## 2024-08-12 LAB
ERYTHROCYTE [DISTWIDTH] IN BLOOD BY AUTOMATED COUNT: 14.1 % (ref 11.5–14.5)
HCT VFR BLD AUTO: 42.4 % (ref 41–52)
HGB BLD-MCNC: 13.3 G/DL (ref 13.5–17.5)
MCH RBC QN AUTO: 31.1 PG (ref 26–34)
MCHC RBC AUTO-ENTMCNC: 31.4 G/DL (ref 32–36)
MCV RBC AUTO: 99 FL (ref 80–100)
NRBC BLD-RTO: 0.5 /100 WBCS (ref 0–0)
PLATELET # BLD AUTO: 114 X10*3/UL (ref 150–450)
RBC # BLD AUTO: 4.27 X10*6/UL (ref 4.5–5.9)
TACROLIMUS BLD-MCNC: 5.9 NG/ML
WBC # BLD AUTO: 4 X10*3/UL (ref 4.4–11.3)

## 2024-08-12 PROCEDURE — 85027 COMPLETE CBC AUTOMATED: CPT

## 2024-08-12 PROCEDURE — 87799 DETECT AGENT NOS DNA QUANT: CPT

## 2024-08-12 PROCEDURE — 80069 RENAL FUNCTION PANEL: CPT

## 2024-08-12 PROCEDURE — 36415 COLL VENOUS BLD VENIPUNCTURE: CPT

## 2024-08-12 PROCEDURE — 80197 ASSAY OF TACROLIMUS: CPT

## 2024-08-13 ENCOUNTER — PHARMACY VISIT (OUTPATIENT)
Dept: PHARMACY | Facility: CLINIC | Age: 74
End: 2024-08-13
Payer: COMMERCIAL

## 2024-08-13 ENCOUNTER — SPECIALTY PHARMACY (OUTPATIENT)
Dept: PHARMACY | Facility: CLINIC | Age: 74
End: 2024-08-13

## 2024-08-13 LAB
ALBUMIN SERPL BCP-MCNC: 4.1 G/DL (ref 3.4–5)
ANION GAP SERPL CALC-SCNC: 13 MMOL/L (ref 10–20)
BKV DNA SERPL NAA+PROBE-LOG#: ABNORMAL {LOG_COPIES}/ML
BUN SERPL-MCNC: 22 MG/DL (ref 6–23)
CALCIUM SERPL-MCNC: 9.2 MG/DL (ref 8.6–10.6)
CHLORIDE SERPL-SCNC: 102 MMOL/L (ref 98–107)
CO2 SERPL-SCNC: 27 MMOL/L (ref 21–32)
CREAT SERPL-MCNC: 1.37 MG/DL (ref 0.5–1.3)
EGFRCR SERPLBLD CKD-EPI 2021: 55 ML/MIN/1.73M*2
GLUCOSE SERPL-MCNC: 126 MG/DL (ref 74–99)
LABORATORY COMMENT REPORT: ABNORMAL
PHOSPHATE SERPL-MCNC: 2.7 MG/DL (ref 2.5–4.9)
POTASSIUM SERPL-SCNC: 3.6 MMOL/L (ref 3.5–5.3)
SODIUM SERPL-SCNC: 138 MMOL/L (ref 136–145)

## 2024-08-13 PROCEDURE — RXMED WILLOW AMBULATORY MEDICATION CHARGE

## 2024-08-19 PROCEDURE — RXMED WILLOW AMBULATORY MEDICATION CHARGE

## 2024-08-20 ENCOUNTER — PHARMACY VISIT (OUTPATIENT)
Dept: PHARMACY | Facility: CLINIC | Age: 74
End: 2024-08-20
Payer: MEDICARE

## 2024-09-11 ENCOUNTER — SPECIALTY PHARMACY (OUTPATIENT)
Dept: PHARMACY | Facility: CLINIC | Age: 74
End: 2024-09-11

## 2024-09-11 PROCEDURE — RXMED WILLOW AMBULATORY MEDICATION CHARGE

## 2024-09-13 ENCOUNTER — PHARMACY VISIT (OUTPATIENT)
Dept: PHARMACY | Facility: CLINIC | Age: 74
End: 2024-09-13
Payer: COMMERCIAL

## 2024-09-18 ENCOUNTER — OFFICE VISIT (OUTPATIENT)
Dept: TRANSPLANT | Facility: CLINIC | Age: 74
End: 2024-09-18
Payer: MEDICARE

## 2024-09-18 ENCOUNTER — SPECIALTY PHARMACY (OUTPATIENT)
Dept: PHARMACY | Facility: CLINIC | Age: 74
End: 2024-09-18

## 2024-09-18 VITALS
BODY MASS INDEX: 30.18 KG/M2 | DIASTOLIC BLOOD PRESSURE: 61 MMHG | WEIGHT: 187 LBS | OXYGEN SATURATION: 99 % | SYSTOLIC BLOOD PRESSURE: 129 MMHG | TEMPERATURE: 97.2 F | HEART RATE: 78 BPM

## 2024-09-18 DIAGNOSIS — Z79.899 IMMUNOSUPPRESSIVE MANAGEMENT ENCOUNTER FOLLOWING KIDNEY TRANSPLANT: ICD-10-CM

## 2024-09-18 DIAGNOSIS — Z94.0 KIDNEY REPLACED BY TRANSPLANT (HHS-HCC): Primary | ICD-10-CM

## 2024-09-18 DIAGNOSIS — R06.09 DOE (DYSPNEA ON EXERTION): ICD-10-CM

## 2024-09-18 DIAGNOSIS — B34.8 BK VIREMIA: ICD-10-CM

## 2024-09-18 DIAGNOSIS — I10 ESSENTIAL HYPERTENSION: ICD-10-CM

## 2024-09-18 DIAGNOSIS — Z94.0 IMMUNOSUPPRESSIVE MANAGEMENT ENCOUNTER FOLLOWING KIDNEY TRANSPLANT: ICD-10-CM

## 2024-09-18 PROCEDURE — 3074F SYST BP LT 130 MM HG: CPT | Performed by: INTERNAL MEDICINE

## 2024-09-18 PROCEDURE — 1126F AMNT PAIN NOTED NONE PRSNT: CPT | Performed by: INTERNAL MEDICINE

## 2024-09-18 PROCEDURE — RXMED WILLOW AMBULATORY MEDICATION CHARGE

## 2024-09-18 PROCEDURE — 3078F DIAST BP <80 MM HG: CPT | Performed by: INTERNAL MEDICINE

## 2024-09-18 PROCEDURE — 1157F ADVNC CARE PLAN IN RCRD: CPT | Performed by: INTERNAL MEDICINE

## 2024-09-18 PROCEDURE — 99214 OFFICE O/P EST MOD 30 MIN: CPT | Performed by: INTERNAL MEDICINE

## 2024-09-18 ASSESSMENT — PAIN SCALES - GENERAL: PAINLEVEL: 0-NO PAIN

## 2024-09-18 NOTE — PATIENT INSTRUCTIONS
Per your request, I refer you to  see cardiology for second opinion if his fistula is causing heart problems.   Blood test on 10/01/24   If BK virus is still there @10/01/24; we will consider decreasing Envarsus XR from 7 mg to 6 mg once a day.   Next follow up 3 months

## 2024-09-18 NOTE — PROGRESS NOTES
TRANSPLANT NEPHROLOGY :   OUTPATIENT CLINIC NOTE      SERVICE DATE : 2024    REASON FOR VISIT/CHIEF COMPLAINT:  S/P  TRANSPLANT SURGERY  IMMUNOSUPPRESSIVE MEDICATION MANAGEMENT  BLOOD PRESSURE MANAGEMENT    HPI:    Mr. Hayes is a 73 y.o. male with past medical history significant for end stage renal disease secondary to hypertensive nephropathy status post living donor kidney transplant from his wife on 2002 with graft failure on 2014. He then underwent a  donor kidney transplant on 2020.      Patient is here to follow up S/P kidney transplant surgery.    Patient is doing well overall. No new complaints. He has an upcoming trip to [place] on Friday.    Denied chest pain, SOB, BARRERA, Palpitation. Normal urination and bowel movement. Normal gait and no weakness of arms/legs. No cough, runny nose, sore throat, cold symptoms, or rash. No hearing loss. Normal vision.No problems with his sleep, mood and function. No recent infection, hospitalization, surgery or ER visits.      ROS:  Review of  14 systems was performed system by system. See HPI. Otherwise, the symptoms were negative.    PAST MEDICAL HISTORY:  Past Medical History:   Diagnosis Date    Chronic obstructive pulmonary disease, unspecified (Multi) 2014    Chronic obstructive pulmonary disease    Elevation of levels of liver transaminase levels     ALT (SGPT) level raised    Encounter for other preprocedural examination 2019    Pre-transplant evaluation for kidney transplant    Encounter for other preprocedural examination 2020    Pre-transplant evaluation for ESRD (end stage renal disease)    End stage renal disease (Multi) 2020    End stage renal disease    Essential (primary) hypertension 2022    Hypertension, unspecified type    Hepatic fibrosis, unspecified 10/01/2015    Hepatic fibrosis    Localized edema 2020    Edema of right lower extremity    Noninfective gastroenteritis and  colitis, unspecified     Colitis    Other conditions influencing health status 05/08/2014    Coronary Artery Disease    Other specified abnormal findings of blood chemistry     Abnormal liver function test    Personal history of diseases of the blood and blood-forming organs and certain disorders involving the immune mechanism     History of anemia    Personal history of other diseases of the circulatory system 02/24/2017    History of angina pectoris    Personal history of other diseases of the circulatory system 02/24/2017    History of congestive heart disease    Personal history of other diseases of the digestive system     History of gastrointestinal hemorrhage    Personal history of other endocrine, nutritional and metabolic disease 11/24/2015    History of hyperlipidemia    Personal history of other endocrine, nutritional and metabolic disease 09/21/2020    History of hypercalcemia    Personal history of other infectious and parasitic diseases 07/26/2013    History of pseudomembranous enterocolitis    Unspecified complication of kidney transplant ([facility]) 08/14/2015    Renal transplant disorder        PAST SURGICAL HISTORY:  Past Surgical History:   Procedure Laterality Date    CORONARY ANGIOPLASTY WITH STENT PLACEMENT  10/01/2015    Cath Stent Placement    CORONARY ANGIOPLASTY WITH STENT PLACEMENT  10/01/2015    Cath Placement Of Stent 1    CORONARY ARTERY BYPASS GRAFT  10/01/2015    Coronary Artery Surgery    CT ABDOMEN PELVIS ANGIOGRAM W AND/OR WO IV CONTRAST  8/8/2019    CT ABDOMEN PELVIS ANGIOGRAM W AND/OR WO IV CONTRAST 8/8/2019 [facility] ANCILLARY LEGACY    CT ABDOMEN PELVIS ANGIOGRAM W AND/OR WO IV CONTRAST  11/6/2019    CT ABDOMEN PELVIS ANGIOGRAM W AND/OR WO IV CONTRAST 11/6/2019 [facility] ANCILLARY LEGACY    CT ABDOMEN PELVIS ANGIOGRAM W AND/OR WO IV CONTRAST  1/9/2020    CT ABDOMEN PELVIS ANGIOGRAM W AND/OR WO IV CONTRAST 1/9/2020 [facility] ANCILLARY LEGACY    CT ABDOMEN PELVIS ANGIOGRAM W AND/OR WO IV CONTRAST   2/27/2020    CT ABDOMEN PELVIS ANGIOGRAM W AND/OR WO IV CONTRAST 2/27/2020 [facility]IB LEGACY    CT ABDOMEN PELVIS ANGIOGRAM W AND/OR WO IV CONTRAST  12/1/2016    CT ABDOMEN PELVIS ANGIOGRAM W AND/OR WO IV CONTRAST 12/1/2016 [facility] ANCILLARY LEGACY    HERNIA REPAIR  10/01/2015    Hernia Repair    IR EMBOLIZATION LYMPH NODE Bilateral 10/21/2019    IR EMBOLIZATION LYMPH NODE 10/21/2019 [facility]IB LEGAC[facility]    IR INTERVENTION VENOUS ARTERIAL PTA  2/4/2020    IR INTERVENTION VENOUS ARTERIAL PTA 2/4/2020 [facility]IB LEGAC[facility]    OTHER SURGICAL HISTORY  12/17/2013    Sigmoidoscopy (Fiberop) W/ Directed Submucosal Injection(S)    OTHER SURGICAL HISTORY  10/01/2015    Arteriovenous Surgery Creation Of A-V Fistula    OTHER SURGICAL HISTORY  10/01/2015    Hemodialysis Access Type Catheter 2 Lumen    OTHER SURGICAL HISTORY  07/06/2020    Renal Transplant        SOCIAL HISTORY:  Social History     Socioeconomic History    Marital status:      Spouse name: Not on file    Number of children: Not on file    Years of education: Not on file    Highest education level: Not on file   Occupational History    Not on file   Tobacco Use    Smoking status: Former     Types: Cigarettes    Smokeless tobacco: Never   Substance and Sexual Activity    Alcohol use: Not on file    Drug use: Not on file    Sexual activity: Not on file   Other Topics Concern    Not on file   Social History Narrative    Not on file     Social Determinants of Health     Financial Resource Strain: Not on file   Food Insecurity: Not on file   Transportation Needs: Not on file   Physical Activity: Not on file   Stress: Not on file   Social Connections: Not on file   Intimate Partner Violence: Not on file   Housing Stability: Not on file       FAMILY HISTORY:  No family history on file.    MEDICATION LIST:  Current Outpatient Medications   Medication Instructions    albuterol 2.5 mg /3 mL (0.083 %) nebulizer solution inhalation    aspirin 81 mg, oral, Daily    atorvastatin  (LIPITOR) 40 mg, oral, Daily    calcium carbonate-vitamin D3 (Calcium 600 + D,3,) 600 mg-10 mcg (400 unit) tablet 1 tablet, oral, Daily    cholecalciferol (VITAMIN D-3) 50 mcg, oral, Daily    cloNIDine (Catapres-TTS-1) 0.1 mg/24 hr patch Apply one patch on the skin and replace every 7 days, as directed    Eliquis 2.5 mg, oral, 2 times daily    Envarsus XR 2 mg, oral, Daily, Total dose 6 mg once a day    Envarsus XR 4 mg, oral, Daily    fluticasone propion-salmeteroL (Advair Diskus) 250-50 mcg/dose diskus inhaler INHALE ONE PUFF BY MOUTH TWO TIMES DAILY    furosemide (LASIX) 40 mg, oral, Daily    ipratropium-albuteroL (Duo-Neb) 0.5-2.5 mg/3 mL nebulizer solution INHALE CONTENTS OF ONE VIAL VIA NEBULIZER THREE TIMES DAILY    losartan (COZAAR) 25 mg, oral, 2 times daily    metoprolol tartrate (LOPRESSOR) 50 mg, oral, 2 times daily    mycophenolate (CELLCEPT) 250 mg, oral, 2 times daily    nitroglycerin (Nitrostat) 0.4 mg SL tablet PLACE 1 TABLET UNDER THE TOUNGE EVERY 5 MINUTES AS NEEDED FOR CHEST PAIN  NOT TO EXCEED 3 DOSES    pantoprazole (PROTONIX) 40 mg, oral, Daily    predniSONE (DELTASONE) 5 mg, oral, Daily       ALLERGY  Allergies   Allergen Reactions    Terbinafine Itching    Niacin Itching       PHYSICAL EXAM:    Visit Vitals  /61   Pulse 78   Temp 36.2 °C (97.2 °F) (Temporal)   Wt 84.8 kg (187 lb)   SpO2 99%   BMI 30.18 kg/m²   Smoking Status Former   BSA 1.99 m²          Vital signs - reviewed. Acceptable BP at this office visit.   General Appearance - NAD, Good speech, oriented and alert  HEENT - Supple. Not pale. No jaundice. No cervical lymphadenopathy. Pharynx and tonsils are not injected.  CVS - RRR. Normal S1/S2. No murmur, click , rub or gallop  Lungs- clear to auscultation bilaterally  Abdomen - soft , not tender, no guarding, no rigidity. No hepatosplenomegaly. Normal bowel sounds. No masses and ascites. S/P Kidney transplant .  Transplanted kidney is not tender.   Musculoskeletal /Extremities  - no edema. Full ROM. No joint tenderness.   Neuro/Psych - appropriate mood and affect. Motor power V/V all extremities. CN I -XII were grossly intact.  Skin - No visible rash      LABS:    Lab Results   Component Value Date    WBC 4.0 (L) 2024    HGB 13.3 (L) 2024    HCT 42.4 2024     (L) 2024    ALT 16 2020    AST 15 2020     2024    K 3.6 2024     2024    CREATININE 1.37 (H) 2024    BUN 22 2024    CO2 27 2024    TSH 0.47 2020    INR 1.1 2021    HGBA1C 4.6 2019     par    ASSESSMENT AND PLAN:    Mr. Hayes is a 73 y.o. male  who is here for follow up s/p kidney transplant.    TRANSPLANT DATE: 2020 (Kidney), 2002 (Kidney)  Primary Cause of Organ Disease: Hypertensive Nephrosclerosis.   Donor/Transplant Type: A  donor renal transplant on: 2020,~A living donor renal transplant on: 2002 with graft failure 2014.   Transplant Course: standard thymoglobulin induction,~No DGF,~No CMV mismatch,~No EBV mismatch,~KDPI: 46%~and~PRA: 0.   Rejections: No episodes of rejection.   Infection: Other: fistula infection (2018), groin fungal infection (2012), UTI, BKV.  COVID 2024  Malignancies After Transplant: No episodes of post transplant malignancy.     1. ESRD S/P kidney transplant   - Creatinine last check was :  Lab Results   Component Value Date    CREATININE 1.37 (H) 2024       - Renal allograft function is good  -Ensure adequate hydration  - Avoid nephrotoxic medications, NSAIDs, and IV contrast.    2. Immunosuppression  -Tacrolimus level last check was 5.9  -Continue current immunosuppression regimen.    3. Electrolytes  Lab Results   Component Value Date    GLUCOSE 126 (H) 2024    CALCIUM 9.2 2024     2024    K 3.6 2024    CO2 27 2024     2024    BUN 22 2024    CREATININE 1.37 (H) 2024     -Acceptable from last  lab drawn    4. Hypertension  Blood Pressures         9/18/2024  1137             BP: 129/61          -Home  BP had been acceptable  -Encourage to monitor home BP  -Continue current anti hypertensive medication    5. Bone Mineral Disease/Osteoporosis  Lab Results   Component Value Date    .5 (H) 08/05/2024    CALCIUM 9.2 08/12/2024    PHOS 2.7 08/12/2024    VITD25 45 08/05/2024     -check VIT D, PTH with next lab  - Consider DEXA every 2-3 years , defer to PCP  - May consider initiation of Sensipar when PTH >300 AND Ca >8.4    6.Anemia  Lab Results   Component Value Date    WBC 4.0 (L) 08/12/2024    HGB 13.3 (L) 08/12/2024    HCT 42.4 08/12/2024    MCV 99 08/12/2024     (L) 08/12/2024     Lab Results   Component Value Date    IRON 161 (H) 06/22/2020    TIBC <216 (A) 06/22/2020    FERRITIN 2,353 (H) 06/22/2020     -asymptomatic  - Continue to monitor  -check iron studies and ferritin. Will consider CELENA as needed.  - No indications for blood transfusion     7.Health maintenance and vaccination  - Flu shot during flu season annually  - Cancer screening is up to date per the patient    8. BK Viremia  - Low PCR  -Recheck 10/01    summary  Per your request, I refer you to  see cardiology for second opinion if his fistula is causing heart problems.   Blood test on 10/01/24   If BK virus is still there @10/01/24; we will consider decreasing Envarsus XR from 7 mg to 6 mg once a day.   Next follow up 3 months    De Dodson    Transplant Nephrology

## 2024-09-19 ENCOUNTER — PHARMACY VISIT (OUTPATIENT)
Dept: PHARMACY | Facility: CLINIC | Age: 74
End: 2024-09-19
Payer: MEDICARE

## 2024-09-24 DIAGNOSIS — Z94.0 KIDNEY REPLACED BY TRANSPLANT (HHS-HCC): ICD-10-CM

## 2024-09-24 DIAGNOSIS — E55.9 VITAMIN D DEFICIENCY: ICD-10-CM

## 2024-09-24 DIAGNOSIS — E21.3 HYPERPARATHYROIDISM (MULTI): ICD-10-CM

## 2024-09-27 ENCOUNTER — OFFICE VISIT (OUTPATIENT)
Dept: CARDIOLOGY | Facility: HOSPITAL | Age: 74
End: 2024-09-27
Payer: MEDICARE

## 2024-09-27 VITALS
OXYGEN SATURATION: 97 % | SYSTOLIC BLOOD PRESSURE: 137 MMHG | BODY MASS INDEX: 30.22 KG/M2 | DIASTOLIC BLOOD PRESSURE: 74 MMHG | HEIGHT: 66 IN | HEART RATE: 77 BPM | WEIGHT: 188 LBS

## 2024-09-27 DIAGNOSIS — I25.10 ASCVD (ARTERIOSCLEROTIC CARDIOVASCULAR DISEASE): Primary | ICD-10-CM

## 2024-09-27 DIAGNOSIS — I77.0 A-V FISTULA (CMS-HCC): ICD-10-CM

## 2024-09-27 PROCEDURE — 99214 OFFICE O/P EST MOD 30 MIN: CPT | Performed by: INTERNAL MEDICINE

## 2024-09-27 PROCEDURE — 99204 OFFICE O/P NEW MOD 45 MIN: CPT | Performed by: INTERNAL MEDICINE

## 2024-09-27 PROCEDURE — 1159F MED LIST DOCD IN RCRD: CPT | Performed by: INTERNAL MEDICINE

## 2024-09-27 PROCEDURE — 1157F ADVNC CARE PLAN IN RCRD: CPT | Performed by: INTERNAL MEDICINE

## 2024-09-27 PROCEDURE — 1126F AMNT PAIN NOTED NONE PRSNT: CPT | Performed by: INTERNAL MEDICINE

## 2024-09-27 PROCEDURE — 1036F TOBACCO NON-USER: CPT | Performed by: INTERNAL MEDICINE

## 2024-09-27 PROCEDURE — 1160F RVW MEDS BY RX/DR IN RCRD: CPT | Performed by: INTERNAL MEDICINE

## 2024-09-27 PROCEDURE — 3008F BODY MASS INDEX DOCD: CPT | Performed by: INTERNAL MEDICINE

## 2024-09-27 ASSESSMENT — PAIN SCALES - GENERAL: PAINLEVEL: 0-NO PAIN

## 2024-09-27 NOTE — PROGRESS NOTES
[facility] Advanced Heart Failure [facility]  Primary Care Physician: Elan Hodges MD  Referring Provider: Ivory  Primary Cardiologist: Johnny ([facility])    Date of Visit: 09/27/2024  3:30 PM EDT  Location of visit: [facility]     HPI:   Mr. 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     At the current time he denies chest pain, pressure, SOB/BARRERA, PND, orthopnea, LE edema, palpitations, light headedness, dizziness, or syncope.     He is followed locally by his cardiologist (Dr. Turner) at [facility]. His last echocardiogram was in 2022 which had preserved biventricular function.       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      aspirin 81 mg EC tablet Take 1 tablet (81 mg) by mouth once daily. 90 tablet 3    atorvastatin (Lipitor) 40 mg tablet Take 1 tablet (40 mg) by mouth once daily.      calcium carbonate-vitamin D3 (Calcium 600 + D,3,) 600 mg-10 mcg (400 unit) tablet Take 1 tablet by mouth once daily. 90 tablet 3    cholecalciferol (Vitamin D-3) 50 MCG (2000 UT) tablet Take 1 tablet (50 mcg) by mouth once daily. (Patient not taking: Reported on 5/23/2024) 90 tablet 3    cloNIDine (Catapres-TTS-1) 0.1  "mg/24 hr patch Apply one patch on the skin and replace every 7 days, as directed 4 patch 11    Eliquis 2.5 mg tablet Take 1 tablet (2.5 mg) by mouth 2 times a day.      fluticasone propion-salmeteroL (Advair Diskus) 250-50 mcg/dose diskus inhaler INHALE ONE PUFF BY MOUTH TWO TIMES DAILY      furosemide (Lasix) 40 mg tablet Take 1 tablet (40 mg) by mouth once daily. 30 tablet 11    ipratropium-albuteroL (Duo-Neb) 0.5-2.5 mg/3 mL nebulizer solution INHALE CONTENTS OF ONE VIAL VIA NEBULIZER THREE TIMES DAILY      losartan (Cozaar) 25 mg tablet Take 1 tablet (25 mg) by mouth 2 times a day. 60 tablet 11    metoprolol tartrate (Lopressor) 50 mg tablet Take 1 tablet by mouth 2 times a day.      mycophenolate (Cellcept) 250 mg capsule Take 1 capsule (250 mg) by mouth 2 times a day. 60 capsule 11    nitroglycerin (Nitrostat) 0.4 mg SL tablet PLACE 1 TABLET UNDER THE TOUNGE EVERY 5 MINUTES AS NEEDED FOR CHEST PAIN  NOT TO EXCEED 3 DOSES      pantoprazole (ProtoNix) 40 mg EC tablet Take 1 tablet (40 mg) by mouth once daily.      predniSONE (Deltasone) 5 mg tablet Take 1 tablet (5 mg) by mouth once daily. 30 tablet 11    tacrolimus ER (Envarsus XR) 1 mg tablet ER Take 2 tablets (2 mg) by mouth once daily. Total dose 6 mg once a day 60 tablet 11    tacrolimus ER (Envarsus XR) 4 mg tablet ER Take 1 tablet (4 mg) by mouth once daily. 30 tablet 11     No current facility-administered medications for this visit.       Allergies   Allergen Reactions    Terbinafine Itching    Niacin Itching       Visit Vitals  /74 (BP Location: Left arm, Patient Position: Sitting)   Pulse 77   Ht 1.676 m (5' 6\")   Wt 85.3 kg (188 lb)   SpO2 97%   BMI 30.34 kg/m²   Smoking Status Former   BSA 1.99 m²        Physical Exam:  On exam Mr. Hayes appears his stated age, is alert and oriented x3, and in no acute distress. His sclera are anicteric and his oropharynx has moist mucous membranes. His neck is supple and without thyromegaly. The JVP is ~5 cm " of water above the right atrium. His cardiac exam has regular rhythm, normal S1, S2. No S3/4. There are no murmurs. His lungs are clear to auscultation bilaterally and there is no dullness to percussion. His abdomen is soft, nontender with normoactive bowel sounds. There is no HJR. The extremities are warm and without edema. There is a LUE AVF present. The skin is dry. There is no rash present. The distal pulses are 2+ in all four extremities. His mood and affect are appropriate for todays encounter.       Cardiac Labs/Diagnostics:    Lab Results   Component Value Date    CREATININE 1.37 (H) 08/12/2024    BUN 22 08/12/2024     08/12/2024    K 3.6 08/12/2024     08/12/2024    CO2 27 08/12/2024        ECG (9/27/24):  Sinus rhythm (HR 74), PACs, RBBB    Echo (7/17/22):  - The left ventricle is normal in size. There is moderate concentric left ventricular hypertrophy. Left ventricular systolic function is normal. EF = 60 ± 5% (2D biplane) Grade I left ventricular diastolic dysfunction.   - The right ventricle is normal in size. Right ventricular systolic function is normal.   - The left atrial cavity is dilated.   - The patient has not had a prior CC echocardiographic exam for comparison.       Impression/Plan:  Mr. 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     1) Cardiac assessment for possible AV fistula ligation  Most recent echo with preserved biventricular function (LVOT VTI not available on the report). Currently without cardiac complaints. ECG today with sinus rhythm and RBBB. He has medication controlled hypertension.     At the current time there would be no immediate cardiac concerns  if he required fistula ligation. Before he would proceed with this, we would recommend a repeat echocardiogram to reassess and determine if further cardiac diagnostics were required.        F/U: as needed    Puk,  Thank you for referring Mr. Hayes to the  Advanced Heart Failure Clinic. Please let me know if you have any questions.       ____________________________________________________________  Daryl Linton,   Section of Advanced Heart Failure and Cardiac Transplantation  Division of Cardiovascular Medicine  [facility]  [facility]

## 2024-09-27 NOTE — PATIENT INSTRUCTIONS
It was a pleasure seeing you today. Please contact myself or my team with any questions.     To reach Dr. Linton' office please call [phone] ([place]).   Fax: [phone]   To schedule an appointment call [phone]     If you have any questions or need cardiac medication refills, please call the Heart Failure office at [phone], option 6. You may also contact the  Heart Failure Nursing team via email at [email or web address] (Please include your name and date of birth).        1) If they decide to move forward with ligation of your fistula, we would need to update an echocardiogram. However, I see no issues from a cardiac standpoint if the fistula needed to be ligated.

## 2024-09-27 NOTE — LETTER
September 27, 2024     De Dodson MD  [id number]  Department Of Medicine-Nephrology  [facility] [address]    Patient: Abel Hayes   YOB: 1950   Date of Visit: 9/27/2024       Dear Dr. De Dodson MD:    Thank you for referring Abel Hayes to me for evaluation. Below are my notes for this consultation.  If you have questions, please do not hesitate to call me. I look forward to following your patient along with you.       Sincerely,     Daryl Linton,       CC: No Recipients  ______________________________________________________________________________________        [facility] Failure [facility]  Primary Care Physician: Elan Hodges MD  Referring Provider: Ivory  [facility]    Date of Visit: 09/27/2024  3:30 PM EDT  Location of visit: [facility]     HPI:   Mr. 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     At the current time he denies chest pain, pressure, SOB/BARRERA, PND, orthopnea, LE edema, palpitations, light headedness, dizziness, or syncope.     He is followed locally by his cardiologist (Dr. Turner) at [facility]. His last echocardiogram was in 2022 which had preserved biventricular function.       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     • aspirin 81 mg EC tablet Take 1 tablet (81 mg) by mouth once daily. 90 tablet 3   • atorvastatin (Lipitor) 40 mg tablet Take 1 tablet (40 mg) by mouth once daily.     • calcium carbonate-vitamin D3 (Calcium 600 + D,3,) 600 mg-10 mcg (400 unit) tablet Take 1 tablet by mouth once daily. 90 tablet 3   • cholecalciferol (Vitamin D-3) 50 MCG (2000 UT) tablet Take 1 tablet (50 mcg) by mouth once daily. (Patient not taking: Reported on 5/23/2024) 90 tablet 3   • cloNIDine (Catapres-TTS-1) 0.1 mg/24 hr patch Apply one patch on the skin and replace every 7 days, as directed 4 patch 11   • Eliquis 2.5 mg tablet Take 1 tablet (2.5 mg) by mouth 2 times a day.     • fluticasone propion-salmeteroL (Advair Diskus) 250-50 mcg/dose diskus inhaler INHALE ONE PUFF BY MOUTH TWO TIMES DAILY     • furosemide (Lasix) 40 mg tablet Take 1 tablet (40 mg) by mouth once daily. 30 tablet 11   • ipratropium-albuteroL (Duo-Neb) 0.5-2.5 mg/3 mL nebulizer solution INHALE CONTENTS OF ONE VIAL VIA NEBULIZER THREE TIMES DAILY     • losartan (Cozaar) 25 mg tablet Take 1 tablet (25 mg) by mouth 2 times a day. 60 tablet 11   • metoprolol tartrate (Lopressor) 50 mg tablet Take 1 tablet by mouth 2 times a day.     • mycophenolate (Cellcept) 250 mg capsule Take 1 capsule (250 mg) by mouth 2 times a day. 60 capsule 11   • nitroglycerin (Nitrostat) 0.4 mg SL tablet PLACE 1 TABLET UNDER THE TOUNGE EVERY 5 MINUTES AS NEEDED FOR CHEST PAIN  NOT TO EXCEED 3 DOSES     • pantoprazole (ProtoNix) 40 mg EC tablet Take 1 tablet (40 mg) by mouth once daily.     • predniSONE (Deltasone) 5 mg tablet Take 1 tablet (5 mg) by mouth once daily. 30 tablet 11   • tacrolimus ER (Envarsus XR) 1 mg tablet ER Take 2 tablets (2 mg) by mouth once daily. Total dose 6 mg once a day 60 tablet 11   • tacrolimus ER (Envarsus XR) 4 mg tablet ER  "Take 1 tablet (4 mg) by mouth once daily. 30 tablet 11     No current facility-administered medications for this visit.       Allergies   Allergen Reactions   • Terbinafine Itching   • Niacin Itching       Visit Vitals  /74 (BP Location: Left arm, Patient Position: Sitting)   Pulse 77   Ht 1.676 m (5' 6\")   Wt 85.3 kg (188 lb)   SpO2 97%   BMI 30.34 kg/m²   Smoking Status Former   BSA 1.99 m²        Physical Exam:  On exam Mr. Hayes appears his stated age, is alert and oriented x3, and in no acute distress. His sclera are anicteric and his oropharynx has moist mucous membranes. His neck is supple and without thyromegaly. The JVP is ~5 cm of water above the right atrium. His cardiac exam has regular rhythm, normal S1, S2. No S3/4. There are no murmurs. His lungs are clear to auscultation bilaterally and there is no dullness to percussion. His abdomen is soft, nontender with normoactive bowel sounds. There is no HJR. The extremities are warm and without edema. There is a LUE AVF present. The skin is dry. There is no rash present. The distal pulses are 2+ in all four extremities. His mood and affect are appropriate for todays encounter.       Cardiac Labs/Diagnostics:    Lab Results   Component Value Date    CREATININE 1.37 (H) 08/12/2024    BUN 22 08/12/2024     08/12/2024    K 3.6 08/12/2024     08/12/2024    CO2 27 08/12/2024        ECG (9/27/24):  Sinus rhythm (HR 74), PACs, RBBB    Echo (7/17/22):  - The left ventricle is normal in size. There is moderate concentric left ventricular hypertrophy. Left ventricular systolic function is normal. EF = 60 ± 5% (2D biplane) Grade I left ventricular diastolic dysfunction.   - The right ventricle is normal in size. Right ventricular systolic function is normal.   - The left atrial cavity is dilated.   - The patient has not had a prior CC echocardiographic exam for comparison.       Impression/Plan:  Mr. 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     1) Cardiac assessment for possible AV fistula ligation  Most recent echo with preserved biventricular function (LVOT VTI not available on the report). Currently without cardiac complaints. ECG today with sinus rhythm and RBBB. He has medication controlled hypertension.     At the current time there would be no immediate cardiac concerns if he required fistula ligation. Before he would proceed with this, we would recommend a repeat echocardiogram to reassess and determine if further cardiac diagnostics were required.        F/U: as needed    Puk,  Thank you for referring Mr. Hayes to the  Advanced Heart Failure Clinic. Please let me know if you have any questions.       ____________________________________________________________  Daryl Linton DO  Section of Advanced Heart Failure and Cardiac Transplantation  Division of Cardiovascular Medicine  [facility]

## 2024-10-03 ENCOUNTER — LAB (OUTPATIENT)
Dept: LAB | Facility: LAB | Age: 74
End: 2024-10-03
Payer: MEDICARE

## 2024-10-03 DIAGNOSIS — E21.3 HYPERPARATHYROIDISM (MULTI): ICD-10-CM

## 2024-10-03 DIAGNOSIS — B34.8 BK VIREMIA: ICD-10-CM

## 2024-10-03 DIAGNOSIS — Z94.0 IMMUNOSUPPRESSIVE MANAGEMENT ENCOUNTER FOLLOWING KIDNEY TRANSPLANT: ICD-10-CM

## 2024-10-03 DIAGNOSIS — R06.09 DOE (DYSPNEA ON EXERTION): ICD-10-CM

## 2024-10-03 DIAGNOSIS — Z79.899 IMMUNOSUPPRESSIVE MANAGEMENT ENCOUNTER FOLLOWING KIDNEY TRANSPLANT: ICD-10-CM

## 2024-10-03 DIAGNOSIS — I10 ESSENTIAL HYPERTENSION: ICD-10-CM

## 2024-10-03 DIAGNOSIS — E55.9 VITAMIN D DEFICIENCY: ICD-10-CM

## 2024-10-03 DIAGNOSIS — Z94.0 KIDNEY REPLACED BY TRANSPLANT (HHS-HCC): ICD-10-CM

## 2024-10-03 LAB
25(OH)D3 SERPL-MCNC: 47 NG/ML (ref 30–100)
ALBUMIN SERPL BCP-MCNC: 4 G/DL (ref 3.4–5)
ANION GAP SERPL CALC-SCNC: 12 MMOL/L (ref 10–20)
BUN SERPL-MCNC: 18 MG/DL (ref 6–23)
CALCIUM SERPL-MCNC: 9.6 MG/DL (ref 8.6–10.6)
CHLORIDE SERPL-SCNC: 102 MMOL/L (ref 98–107)
CO2 SERPL-SCNC: 29 MMOL/L (ref 21–32)
CREAT SERPL-MCNC: 1.42 MG/DL (ref 0.5–1.3)
CREAT UR-MCNC: 134.6 MG/DL (ref 20–370)
EGFRCR SERPLBLD CKD-EPI 2021: 53 ML/MIN/1.73M*2
ERYTHROCYTE [DISTWIDTH] IN BLOOD BY AUTOMATED COUNT: 13.7 % (ref 11.5–14.5)
GLUCOSE SERPL-MCNC: 113 MG/DL (ref 74–99)
HCT VFR BLD AUTO: 41.9 % (ref 41–52)
HGB BLD-MCNC: 13.5 G/DL (ref 13.5–17.5)
MAGNESIUM SERPL-MCNC: 1.61 MG/DL (ref 1.6–2.4)
MCH RBC QN AUTO: 31.8 PG (ref 26–34)
MCHC RBC AUTO-ENTMCNC: 32.2 G/DL (ref 32–36)
MCV RBC AUTO: 99 FL (ref 80–100)
NRBC BLD-RTO: 0 /100 WBCS (ref 0–0)
PHOSPHATE SERPL-MCNC: 2.8 MG/DL (ref 2.5–4.9)
PLATELET # BLD AUTO: 123 X10*3/UL (ref 150–450)
POTASSIUM SERPL-SCNC: 4 MMOL/L (ref 3.5–5.3)
PROT UR-ACNC: 10 MG/DL (ref 5–25)
PROT/CREAT UR: 0.07 MG/MG CREAT (ref 0–0.17)
RBC # BLD AUTO: 4.24 X10*6/UL (ref 4.5–5.9)
SODIUM SERPL-SCNC: 139 MMOL/L (ref 136–145)
TACROLIMUS BLD-MCNC: 6.6 NG/ML
WBC # BLD AUTO: 4.6 X10*3/UL (ref 4.4–11.3)

## 2024-10-03 PROCEDURE — 83735 ASSAY OF MAGNESIUM: CPT

## 2024-10-03 PROCEDURE — 80197 ASSAY OF TACROLIMUS: CPT

## 2024-10-03 PROCEDURE — 85027 COMPLETE CBC AUTOMATED: CPT

## 2024-10-03 PROCEDURE — 36415 COLL VENOUS BLD VENIPUNCTURE: CPT

## 2024-10-03 PROCEDURE — 80069 RENAL FUNCTION PANEL: CPT

## 2024-10-03 PROCEDURE — 82570 ASSAY OF URINE CREATININE: CPT

## 2024-10-03 PROCEDURE — 82306 VITAMIN D 25 HYDROXY: CPT

## 2024-10-03 PROCEDURE — 83970 ASSAY OF PARATHORMONE: CPT

## 2024-10-03 PROCEDURE — 84156 ASSAY OF PROTEIN URINE: CPT

## 2024-10-03 PROCEDURE — 87799 DETECT AGENT NOS DNA QUANT: CPT

## 2024-10-04 LAB
BKV DNA SERPL NAA+PROBE-ACNC: 54 IU/ML
BKV DNA SERPL NAA+PROBE-LOG#: 1.73 LOG IU/ML
LABORATORY COMMENT REPORT: DETECTED
PTH-INTACT SERPL-MCNC: 84.1 PG/ML (ref 18.5–88)

## 2024-10-14 ENCOUNTER — SPECIALTY PHARMACY (OUTPATIENT)
Dept: PHARMACY | Facility: CLINIC | Age: 74
End: 2024-10-14

## 2024-10-14 PROCEDURE — RXMED WILLOW AMBULATORY MEDICATION CHARGE

## 2024-10-15 ENCOUNTER — PHARMACY VISIT (OUTPATIENT)
Dept: PHARMACY | Facility: CLINIC | Age: 74
End: 2024-10-15
Payer: COMMERCIAL

## 2024-11-13 ENCOUNTER — SPECIALTY PHARMACY (OUTPATIENT)
Dept: PHARMACY | Facility: CLINIC | Age: 74
End: 2024-11-13

## 2024-11-13 PROCEDURE — RXMED WILLOW AMBULATORY MEDICATION CHARGE

## 2024-11-14 ENCOUNTER — PHARMACY VISIT (OUTPATIENT)
Dept: PHARMACY | Facility: CLINIC | Age: 74
End: 2024-11-14
Payer: COMMERCIAL

## 2024-12-02 ENCOUNTER — LAB (OUTPATIENT)
Dept: LAB | Facility: LAB | Age: 74
End: 2024-12-02
Payer: MEDICARE

## 2024-12-02 DIAGNOSIS — Z94.0 KIDNEY REPLACED BY TRANSPLANT (HHS-HCC): ICD-10-CM

## 2024-12-02 LAB
ALBUMIN SERPL BCP-MCNC: 4 G/DL (ref 3.4–5)
ANION GAP SERPL CALC-SCNC: 15 MMOL/L (ref 10–20)
BUN SERPL-MCNC: 17 MG/DL (ref 6–23)
CALCIUM SERPL-MCNC: 9.8 MG/DL (ref 8.6–10.6)
CHLORIDE SERPL-SCNC: 103 MMOL/L (ref 98–107)
CO2 SERPL-SCNC: 28 MMOL/L (ref 21–32)
CREAT SERPL-MCNC: 1.36 MG/DL (ref 0.5–1.3)
EGFRCR SERPLBLD CKD-EPI 2021: 55 ML/MIN/1.73M*2
ERYTHROCYTE [DISTWIDTH] IN BLOOD BY AUTOMATED COUNT: 13.6 % (ref 11.5–14.5)
GLUCOSE SERPL-MCNC: 108 MG/DL (ref 74–99)
HCT VFR BLD AUTO: 46.9 % (ref 41–52)
HGB BLD-MCNC: 14.5 G/DL (ref 13.5–17.5)
MAGNESIUM SERPL-MCNC: 1.69 MG/DL (ref 1.6–2.4)
MCH RBC QN AUTO: 30.7 PG (ref 26–34)
MCHC RBC AUTO-ENTMCNC: 30.9 G/DL (ref 32–36)
MCV RBC AUTO: 99 FL (ref 80–100)
NRBC BLD-RTO: 0 /100 WBCS (ref 0–0)
PHOSPHATE SERPL-MCNC: 2.8 MG/DL (ref 2.5–4.9)
PLATELET # BLD AUTO: 128 X10*3/UL (ref 150–450)
POTASSIUM SERPL-SCNC: 4.6 MMOL/L (ref 3.5–5.3)
RBC # BLD AUTO: 4.72 X10*6/UL (ref 4.5–5.9)
SODIUM SERPL-SCNC: 141 MMOL/L (ref 136–145)
TACROLIMUS BLD-MCNC: 9.4 NG/ML
WBC # BLD AUTO: 6.1 X10*3/UL (ref 4.4–11.3)

## 2024-12-02 PROCEDURE — 83735 ASSAY OF MAGNESIUM: CPT

## 2024-12-02 PROCEDURE — 80069 RENAL FUNCTION PANEL: CPT

## 2024-12-02 PROCEDURE — 87799 DETECT AGENT NOS DNA QUANT: CPT

## 2024-12-02 PROCEDURE — 36415 COLL VENOUS BLD VENIPUNCTURE: CPT

## 2024-12-02 PROCEDURE — 80197 ASSAY OF TACROLIMUS: CPT

## 2024-12-02 PROCEDURE — 85027 COMPLETE CBC AUTOMATED: CPT

## 2024-12-03 LAB
BKV DNA SERPL NAA+PROBE-ACNC: 23 IU/ML
BKV DNA SERPL NAA+PROBE-LOG#: 1.36 LOG IU/ML
LABORATORY COMMENT REPORT: DETECTED

## 2024-12-04 ENCOUNTER — TELEPHONE (OUTPATIENT)
Dept: TRANSPLANT | Facility: HOSPITAL | Age: 74
End: 2024-12-04
Payer: MEDICARE

## 2024-12-04 DIAGNOSIS — Z94.0 KIDNEY REPLACED BY TRANSPLANT (HHS-HCC): ICD-10-CM

## 2024-12-04 NOTE — TELEPHONE ENCOUNTER
Reviewed labs with Dr. Ahmadi  Tac 9.4 from 6.6, 5.9, 7.3, 7.8  No recent envarsus dose changes, on 6 mg daily     PLAN:  Patient repeat labs in 1 week, patient has appt on 12/11/24  Called and LVM for patient requesting repeat Tac lab draw.   Asked for call back if has any questions.

## 2024-12-10 ENCOUNTER — LAB (OUTPATIENT)
Dept: LAB | Facility: LAB | Age: 74
End: 2024-12-10
Payer: MEDICARE

## 2024-12-10 DIAGNOSIS — Z94.0 KIDNEY REPLACED BY TRANSPLANT (HHS-HCC): ICD-10-CM

## 2024-12-10 LAB — TACROLIMUS BLD-MCNC: 4.4 NG/ML

## 2024-12-10 PROCEDURE — 80197 ASSAY OF TACROLIMUS: CPT

## 2024-12-10 PROCEDURE — 36415 COLL VENOUS BLD VENIPUNCTURE: CPT

## 2024-12-11 ENCOUNTER — OFFICE VISIT (OUTPATIENT)
Dept: TRANSPLANT | Facility: CLINIC | Age: 74
End: 2024-12-11
Payer: MEDICARE

## 2024-12-11 VITALS
BODY MASS INDEX: 30.18 KG/M2 | SYSTOLIC BLOOD PRESSURE: 149 MMHG | HEART RATE: 69 BPM | WEIGHT: 187 LBS | OXYGEN SATURATION: 98 % | DIASTOLIC BLOOD PRESSURE: 68 MMHG | TEMPERATURE: 97.1 F

## 2024-12-11 DIAGNOSIS — Z79.899 IMMUNOSUPPRESSIVE MANAGEMENT ENCOUNTER FOLLOWING KIDNEY TRANSPLANT: ICD-10-CM

## 2024-12-11 DIAGNOSIS — Z94.0 IMMUNOSUPPRESSIVE MANAGEMENT ENCOUNTER FOLLOWING KIDNEY TRANSPLANT: ICD-10-CM

## 2024-12-11 DIAGNOSIS — Z94.0 KIDNEY REPLACED BY TRANSPLANT (HHS-HCC): Primary | ICD-10-CM

## 2024-12-11 DIAGNOSIS — I10 ESSENTIAL HYPERTENSION: ICD-10-CM

## 2024-12-11 PROCEDURE — 1157F ADVNC CARE PLAN IN RCRD: CPT | Performed by: INTERNAL MEDICINE

## 2024-12-11 PROCEDURE — 3077F SYST BP >= 140 MM HG: CPT | Performed by: INTERNAL MEDICINE

## 2024-12-11 PROCEDURE — 99214 OFFICE O/P EST MOD 30 MIN: CPT | Performed by: INTERNAL MEDICINE

## 2024-12-11 PROCEDURE — RXMED WILLOW AMBULATORY MEDICATION CHARGE

## 2024-12-11 PROCEDURE — 3078F DIAST BP <80 MM HG: CPT | Performed by: INTERNAL MEDICINE

## 2024-12-11 PROCEDURE — 1126F AMNT PAIN NOTED NONE PRSNT: CPT | Performed by: INTERNAL MEDICINE

## 2024-12-11 ASSESSMENT — PAIN SCALES - GENERAL: PAINLEVEL_OUTOF10: 0-NO PAIN

## 2024-12-12 ENCOUNTER — SPECIALTY PHARMACY (OUTPATIENT)
Dept: PHARMACY | Facility: CLINIC | Age: 74
End: 2024-12-12

## 2024-12-12 PROCEDURE — RXMED WILLOW AMBULATORY MEDICATION CHARGE

## 2024-12-13 ENCOUNTER — PHARMACY VISIT (OUTPATIENT)
Dept: PHARMACY | Facility: CLINIC | Age: 74
End: 2024-12-13
Payer: COMMERCIAL

## 2024-12-13 NOTE — PROGRESS NOTES
TRANSPLANT NEPHROLOGY :   OUTPATIENT CLINIC NOTE      SERVICE DATE : 2024    REASON FOR VISIT/CHIEF COMPLAINT:  S/P  TRANSPLANT SURGERY  IMMUNOSUPPRESSIVE MEDICATION MANAGEMENT  BLOOD PRESSURE MANAGEMENT    HPI:    Mr. Hayes is a 74 y.o. male with past medical history significant for  end stage renal disease secondary to hypertensive nephropathy status post living donor kidney transplant from his wife on 2002 with graft failure on 2014. He then underwent a  donor kidney transplant on 2020.      Patient is here to follow up S/P kidney transplant surgery.    Patient is doing well overall. No new complaints. Denied chest pain, SOB, BARRERA, Palpitation. Normal urination and bowel movement. Normal gait and no weakness of arms/legs. No cough, runny nose, sore throat, cold symptoms, or rash. No hearing loss. Normal vision.No problems with his sleep, mood and function. No recent infection, hospitalization, surgery or ER visits.      ROS:  Review of  14 systems was performed system by system. See HPI. Otherwise, the symptoms were negative.    PAST MEDICAL HISTORY:  Past Medical History:   Diagnosis Date    Chronic obstructive pulmonary disease, unspecified (Multi) 2014    Chronic obstructive pulmonary disease    Elevation of levels of liver transaminase levels     ALT (SGPT) level raised    Encounter for other preprocedural examination 2019    Pre-transplant evaluation for kidney transplant    Encounter for other preprocedural examination 2020    Pre-transplant evaluation for ESRD (end stage renal disease)    End stage renal disease (Multi) 2020    End stage renal disease    Essential (primary) hypertension 2022    Hypertension, unspecified type    Hepatic fibrosis, unspecified 10/01/2015    Hepatic fibrosis    Localized edema 2020    Edema of right lower extremity    Noninfective gastroenteritis and colitis, unspecified     Colitis    Other conditions  influencing health status 05/08/2014    Coronary Artery Disease    Other specified abnormal findings of blood chemistry     Abnormal liver function test    Personal history of diseases of the blood and blood-forming organs and certain disorders involving the immune mechanism     History of anemia    Personal history of other diseases of the circulatory system 02/24/2017    History of angina pectoris    Personal history of other diseases of the circulatory system 02/24/2017    History of congestive heart disease    Personal history of other diseases of the digestive system     History of gastrointestinal hemorrhage    Personal history of other endocrine, nutritional and metabolic disease 11/24/2015    History of hyperlipidemia    Personal history of other endocrine, nutritional and metabolic disease 09/21/2020    History of hypercalcemia    Personal history of other infectious and parasitic diseases 07/26/2013    History of pseudomembranous enterocolitis    Unspecified complication of kidney transplant ([facility]) 08/14/2015    Renal transplant disorder        PAST SURGICAL HISTORY:  Past Surgical History:   Procedure Laterality Date    CORONARY ANGIOPLASTY WITH STENT PLACEMENT  10/01/2015    Cath Stent Placement    CORONARY ANGIOPLASTY WITH STENT PLACEMENT  10/01/2015    Cath Placement Of Stent 1    CORONARY ARTERY BYPASS GRAFT  10/01/2015    Coronary Artery Surgery    CT ABDOMEN PELVIS ANGIOGRAM W AND/OR WO IV CONTRAST  8/8/2019    CT ABDOMEN PELVIS ANGIOGRAM W AND/OR WO IV CONTRAST 8/8/2019 [facility] [place] ANCILLARY LEGACY    CT ABDOMEN PELVIS ANGIOGRAM W AND/OR WO IV CONTRAST  11/6/2019    CT ABDOMEN PELVIS ANGIOGRAM W AND/OR WO IV CONTRAST 11/6/2019 [facility] [place] ANCILLARY LEGACY    CT ABDOMEN PELVIS ANGIOGRAM W AND/OR WO IV CONTRAST  1/9/2020    CT ABDOMEN PELVIS ANGIOGRAM W AND/OR WO IV CONTRAST 1/9/2020 [facility] [place] ANCILLARY LEGACY    CT ABDOMEN PELVIS ANGIOGRAM W AND/OR WO IV CONTRAST  2/27/2020    CT ABDOMEN PELVIS ANGIOGRAM W AND/OR WO  IV CONTRAST [place] [facility] [place]    CT ABDOMEN PELVIS ANGIOGRAM W AND/OR WO IV CONTRAST  12/1/2016    CT ABDOMEN PELVIS ANGIOGRAM W AND/OR WO IV CONTRAST 12/1/2016 [facility] ANCILLARY LEGACY    HERNIA REPAIR  10/01/2015    Hernia Repair    IR EMBOLIZATION LYMPH NODE Bilateral 10/21/2019    IR EMBOLIZATION LYMPH NODE 10/21/2019 [facility] [place] [place]    IR INTERVENTION VENOUS ARTERIAL PTA  2/4/2020    IR INTERVENTION VENOUS ARTERIAL PTA 2/4/2020 [facility] [place]    OTHER SURGICAL HISTORY  12/17/2013    Sigmoidoscopy (Fiberop) W/ Directed Submucosal Injection(S)    OTHER SURGICAL HISTORY  10/01/2015    Arteriovenous Surgery Creation Of A-V Fistula    OTHER SURGICAL HISTORY  10/01/2015    Hemodialysis Access Type Catheter 2 Lumen    OTHER SURGICAL HISTORY  07/06/2020    Renal Transplant        SOCIAL HISTORY:  Social History     Socioeconomic History    Marital status:      Spouse name: Not on file    Number of children: Not on file    Years of education: Not on file    Highest education level: Not on file   Occupational History    Not on file   Tobacco Use    Smoking status: Former     Types: Cigarettes    Smokeless tobacco: Never   Substance and Sexual Activity    Alcohol use: Not on file    Drug use: Not on file    Sexual activity: Not on file   Other Topics Concern    Not on file   Social History Narrative    Not on file     Social Drivers of Health     Financial Resource Strain: Not on file   Food Insecurity: Not on file   Transportation Needs: Not on file   Physical Activity: Not on file   Stress: Not on file   Social Connections: Not on file   Intimate Partner Violence: Not on file   Housing Stability: Not on file       FAMILY HISTORY:  No family history on file.    MEDICATION LIST:  Current Outpatient Medications   Medication Instructions    albuterol 2.5 mg /3 mL (0.083 %) nebulizer solution inhalation    aspirin 81 mg, oral, Daily    atorvastatin (LIPITOR) 40 mg, oral, Daily    calcium carbonate-vitamin  D3 (Calcium 600 + D,3,) 600 mg-10 mcg (400 unit) tablet 1 tablet, oral, Daily    cholecalciferol (VITAMIN D-3) 50 mcg, oral, Daily    cloNIDine (Catapres-TTS-1) 0.1 mg/24 hr patch Apply one patch on the skin and replace every 7 days, as directed    Eliquis 2.5 mg, oral, 2 times daily    fluticasone propion-salmeteroL (Advair Diskus) 250-50 mcg/dose diskus inhaler INHALE ONE PUFF BY MOUTH TWO TIMES DAILY    furosemide (LASIX) 40 mg, oral, Daily    ipratropium-albuteroL (Duo-Neb) 0.5-2.5 mg/3 mL nebulizer solution INHALE CONTENTS OF ONE VIAL VIA NEBULIZER THREE TIMES DAILY    losartan (COZAAR) 25 mg, oral, 2 times daily    metoprolol tartrate (LOPRESSOR) 50 mg, oral, 2 times daily    mycophenolate (CELLCEPT) 250 mg, oral, 2 times daily    nitroglycerin (Nitrostat) 0.4 mg SL tablet PLACE 1 TABLET UNDER THE TOUNGE EVERY 5 MINUTES AS NEEDED FOR CHEST PAIN  NOT TO EXCEED 3 DOSES    pantoprazole (PROTONIX) 40 mg, oral, Daily    predniSONE (DELTASONE) 5 mg, oral, Daily    tacrolimus ER (ENVARSUS XR) 2 mg, oral, Daily, Total dose 6 mg once a day    tacrolimus ER (ENVARSUS XR) 4 mg, oral, Daily       ALLERGY  Allergies   Allergen Reactions    Terbinafine Itching    Niacin Itching       PHYSICAL EXAM:    Visit Vitals  /68   Pulse 69   Temp 36.2 °C (97.1 °F) (Temporal)   Wt 84.8 kg (187 lb)   SpO2 98%   BMI 30.18 kg/m²   Smoking Status Former   BSA 1.99 m²          Vital signs - reviewed. Acceptable BP at this office visit.   General Appearance - NAD, Good speech, oriented and alert  HEENT - Supple. Not pale. No jaundice. No cervical lymphadenopathy. Pharynx and tonsils are not injected.  CVS - RRR. Normal S1/S2. No murmur, click , rub or gallop  Lungs- clear to auscultation bilaterally  Abdomen - soft , not tender, no guarding, no rigidity. No hepatosplenomegaly. Normal bowel sounds. No masses and ascites. S/P Kidney transplant .  Transplanted kidney is not tender.   Musculoskeletal /Extremities - no edema. Full ROM. No  joint tenderness.   Neuro/Psych - appropriate mood and affect. Motor power V/V all extremities. CN I -XII were grossly intact.  Skin - No visible rash      LABS:    Lab Results   Component Value Date    WBC 6.1 12/02/2024    HGB 14.5 12/02/2024    HCT 46.9 12/02/2024     (L) 12/02/2024    ALT 16 06/18/2020    AST 15 06/18/2020     12/02/2024    K 4.6 12/02/2024     12/02/2024    CREATININE 1.36 (H) 12/02/2024    BUN 17 12/02/2024    CO2 28 12/02/2024    TSH 0.47 06/20/2020    INR 1.1 01/26/2021    HGBA1C 4.6 06/13/2019     par    ASSESSMENT AND PLAN:    Mr. Hayes is a 74 y.o. male  who is here for follow up s/p kidney transplant.    TRANSPLANT DATE: 6/18/2020 (Kidney), 8/6/2002 (Kidney)      1. ESRD S/P kidney transplant   - Creatinine last check was :  Lab Results   Component Value Date    CREATININE 1.36 (H) 12/02/2024       -Ensure adequate hydration  - Avoid nephrotoxic medications, NSAIDs, and IV contrast.    2. Immunosuppression    -Continue current immunosuppression regimen.    3. Electrolytes  Lab Results   Component Value Date    GLUCOSE 108 (H) 12/02/2024    CALCIUM 9.8 12/02/2024     12/02/2024    K 4.6 12/02/2024    CO2 28 12/02/2024     12/02/2024    BUN 17 12/02/2024    CREATININE 1.36 (H) 12/02/2024     -Acceptable from last lab drawn    4. Hypertension  Blood Pressures         12/11/2024  1147             BP: 149/68          -Home  BP had been acceptable  -Encourage to monitor home BP  -Continue current anti hypertensive medication    5. Bone Mineral Disease/Osteoporosis  Lab Results   Component Value Date    PTH 84.1 10/03/2024    CALCIUM 9.8 12/02/2024    PHOS 2.8 12/02/2024    VITD25 47 10/03/2024     -check VIT D, PTH with next lab  - Consider DEXA every 2-3 years , defer to PCP  - May consider initiation of Sensipar when PTH >300 AND Ca >8.4    6..Health maintenance and vaccination  - Flu shot during flu season annually  - Cancer screening is up to date per the  patient    Summary  Blood test next week, then can do monthly  Next 3-4 months    De Dodson MD    Transplant Nephrology

## 2024-12-18 ENCOUNTER — APPOINTMENT (OUTPATIENT)
Dept: TRANSPLANT | Facility: CLINIC | Age: 74
End: 2024-12-18
Payer: MEDICARE

## 2024-12-19 ENCOUNTER — LAB (OUTPATIENT)
Dept: LAB | Facility: LAB | Age: 74
End: 2024-12-19
Payer: MEDICARE

## 2024-12-19 DIAGNOSIS — E55.9 VITAMIN D DEFICIENCY: ICD-10-CM

## 2024-12-19 DIAGNOSIS — E21.3 HYPERPARATHYROIDISM (MULTI): ICD-10-CM

## 2024-12-19 DIAGNOSIS — Z94.0 KIDNEY REPLACED BY TRANSPLANT (HHS-HCC): ICD-10-CM

## 2024-12-19 LAB
CREAT UR-MCNC: 139.8 MG/DL (ref 20–370)
PROT UR-ACNC: 12 MG/DL (ref 5–25)
PROT/CREAT UR: 0.09 MG/MG CREAT (ref 0–0.17)
TACROLIMUS BLD-MCNC: 5.3 NG/ML

## 2024-12-19 PROCEDURE — 36415 COLL VENOUS BLD VENIPUNCTURE: CPT

## 2024-12-19 PROCEDURE — 80069 RENAL FUNCTION PANEL: CPT

## 2024-12-19 PROCEDURE — 83735 ASSAY OF MAGNESIUM: CPT

## 2024-12-19 PROCEDURE — 82306 VITAMIN D 25 HYDROXY: CPT

## 2024-12-19 PROCEDURE — 84156 ASSAY OF PROTEIN URINE: CPT

## 2024-12-19 PROCEDURE — 80197 ASSAY OF TACROLIMUS: CPT

## 2024-12-19 PROCEDURE — 82570 ASSAY OF URINE CREATININE: CPT

## 2024-12-19 PROCEDURE — 83970 ASSAY OF PARATHORMONE: CPT

## 2024-12-20 LAB
25(OH)D3 SERPL-MCNC: 57 NG/ML (ref 30–100)
ALBUMIN SERPL BCP-MCNC: 3.8 G/DL (ref 3.4–5)
ANION GAP SERPL CALC-SCNC: 13 MMOL/L (ref 10–20)
BUN SERPL-MCNC: 20 MG/DL (ref 6–23)
CALCIUM SERPL-MCNC: 9.2 MG/DL (ref 8.6–10.6)
CHLORIDE SERPL-SCNC: 105 MMOL/L (ref 98–107)
CO2 SERPL-SCNC: 28 MMOL/L (ref 21–32)
CREAT SERPL-MCNC: 1.4 MG/DL (ref 0.5–1.3)
EGFRCR SERPLBLD CKD-EPI 2021: 53 ML/MIN/1.73M*2
GLUCOSE SERPL-MCNC: 129 MG/DL (ref 74–99)
MAGNESIUM SERPL-MCNC: 1.75 MG/DL (ref 1.6–2.4)
PHOSPHATE SERPL-MCNC: 2.4 MG/DL (ref 2.5–4.9)
POTASSIUM SERPL-SCNC: 4 MMOL/L (ref 3.5–5.3)
PTH-INTACT SERPL-MCNC: 70.9 PG/ML (ref 18.5–88)
SODIUM SERPL-SCNC: 142 MMOL/L (ref 136–145)

## 2025-01-13 ENCOUNTER — SPECIALTY PHARMACY (OUTPATIENT)
Dept: PHARMACY | Facility: CLINIC | Age: 75
End: 2025-01-13

## 2025-01-13 PROCEDURE — RXMED WILLOW AMBULATORY MEDICATION CHARGE

## 2025-01-15 ENCOUNTER — PHARMACY VISIT (OUTPATIENT)
Dept: PHARMACY | Facility: CLINIC | Age: 75
End: 2025-01-15
Payer: COMMERCIAL

## 2025-02-03 PROCEDURE — 84156 ASSAY OF PROTEIN URINE: CPT

## 2025-02-03 PROCEDURE — 82570 ASSAY OF URINE CREATININE: CPT

## 2025-02-03 PROCEDURE — 80197 ASSAY OF TACROLIMUS: CPT

## 2025-02-03 PROCEDURE — 85027 COMPLETE CBC AUTOMATED: CPT

## 2025-02-03 PROCEDURE — 87799 DETECT AGENT NOS DNA QUANT: CPT

## 2025-02-03 PROCEDURE — 80069 RENAL FUNCTION PANEL: CPT

## 2025-02-03 PROCEDURE — 83735 ASSAY OF MAGNESIUM: CPT

## 2025-02-03 PROCEDURE — 82306 VITAMIN D 25 HYDROXY: CPT

## 2025-02-04 ENCOUNTER — LAB (OUTPATIENT)
Dept: LAB | Facility: HOSPITAL | Age: 75
End: 2025-02-04
Payer: MEDICARE

## 2025-02-04 DIAGNOSIS — E55.9 VITAMIN D DEFICIENCY: ICD-10-CM

## 2025-02-04 DIAGNOSIS — Z94.0 KIDNEY REPLACED BY TRANSPLANT (HHS-HCC): ICD-10-CM

## 2025-02-04 DIAGNOSIS — E21.3 HYPERPARATHYROIDISM (MULTI): ICD-10-CM

## 2025-02-04 LAB
25(OH)D3 SERPL-MCNC: 46 NG/ML (ref 30–100)
ALBUMIN SERPL BCP-MCNC: 4.1 G/DL (ref 3.4–5)
ANION GAP SERPL CALC-SCNC: 11 MMOL/L (ref 10–20)
BKV DNA SERPL NAA+PROBE-ACNC: 25 IU/ML
BKV DNA SERPL NAA+PROBE-LOG#: 1.4 LOG IU/ML
BUN SERPL-MCNC: 21 MG/DL (ref 6–23)
CALCIUM SERPL-MCNC: 9.5 MG/DL (ref 8.6–10.6)
CHLORIDE SERPL-SCNC: 104 MMOL/L (ref 98–107)
CO2 SERPL-SCNC: 29 MMOL/L (ref 21–32)
CREAT SERPL-MCNC: 1.45 MG/DL (ref 0.5–1.3)
CREAT UR-MCNC: 136 MG/DL (ref 20–370)
EGFRCR SERPLBLD CKD-EPI 2021: 51 ML/MIN/1.73M*2
ERYTHROCYTE [DISTWIDTH] IN BLOOD BY AUTOMATED COUNT: 13.5 % (ref 11.5–14.5)
GLUCOSE SERPL-MCNC: 114 MG/DL (ref 74–99)
HCT VFR BLD AUTO: 45.4 % (ref 41–52)
HGB BLD-MCNC: 14.2 G/DL (ref 13.5–17.5)
LABORATORY COMMENT REPORT: DETECTED
MAGNESIUM SERPL-MCNC: 1.97 MG/DL (ref 1.6–2.4)
MCH RBC QN AUTO: 31.5 PG (ref 26–34)
MCHC RBC AUTO-ENTMCNC: 31.3 G/DL (ref 32–36)
MCV RBC AUTO: 101 FL (ref 80–100)
NRBC BLD-RTO: 0 /100 WBCS (ref 0–0)
PHOSPHATE SERPL-MCNC: 2.4 MG/DL (ref 2.5–4.9)
PLATELET # BLD AUTO: 121 X10*3/UL (ref 150–450)
POTASSIUM SERPL-SCNC: 4 MMOL/L (ref 3.5–5.3)
PROT UR-ACNC: 9 MG/DL (ref 5–25)
PROT/CREAT UR: 0.07 MG/MG CREAT (ref 0–0.17)
RBC # BLD AUTO: 4.51 X10*6/UL (ref 4.5–5.9)
SODIUM SERPL-SCNC: 140 MMOL/L (ref 136–145)
TACROLIMUS BLD-MCNC: 6.1 NG/ML
WBC # BLD AUTO: 4.7 X10*3/UL (ref 4.4–11.3)

## 2025-02-05 ENCOUNTER — TELEPHONE (OUTPATIENT)
Facility: HOSPITAL | Age: 75
End: 2025-02-05
Payer: MEDICARE

## 2025-02-05 DIAGNOSIS — Z94.0 KIDNEY REPLACED BY TRANSPLANT (HHS-HCC): ICD-10-CM

## 2025-02-13 ENCOUNTER — SPECIALTY PHARMACY (OUTPATIENT)
Dept: PHARMACY | Facility: CLINIC | Age: 75
End: 2025-02-13

## 2025-02-13 ENCOUNTER — PHARMACY VISIT (OUTPATIENT)
Dept: PHARMACY | Facility: CLINIC | Age: 75
End: 2025-02-13
Payer: COMMERCIAL

## 2025-02-13 PROCEDURE — RXMED WILLOW AMBULATORY MEDICATION CHARGE

## 2025-02-24 ENCOUNTER — TELEPHONE (OUTPATIENT)
Facility: HOSPITAL | Age: 75
End: 2025-02-24
Payer: MEDICARE

## 2025-02-24 DIAGNOSIS — Z94.0 KIDNEY REPLACED BY TRANSPLANT (HHS-HCC): ICD-10-CM
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
## 2025-02-25 NOTE — TELEPHONE ENCOUNTER
Discussed prior phone message with Dr Austin    PLAN: get urine sample and lab work. Let us know if pain over kidney does not go away or gets worse    Called pt- pt agreeable to plan

## 2025-02-26 ENCOUNTER — TELEPHONE (OUTPATIENT)
Facility: HOSPITAL | Age: 75
End: 2025-02-26
Payer: MEDICARE

## 2025-02-26 NOTE — TELEPHONE ENCOUNTER
Patient called requesting to speak with coordinator about he wanted coordinator to know that he is going to be getting labs thursday at [place].  Patient call back number is [phone]

## 2025-02-27 ENCOUNTER — LAB (OUTPATIENT)
Dept: LAB | Facility: HOSPITAL | Age: 75
End: 2025-02-27
Payer: MEDICARE

## 2025-02-27 DIAGNOSIS — Z94.0 KIDNEY TRANSPLANT STATUS: Primary | ICD-10-CM

## 2025-02-27 LAB
ALBUMIN SERPL BCP-MCNC: 4 G/DL (ref 3.4–5)
ANION GAP SERPL CALC-SCNC: 12 MMOL/L (ref 10–20)
APPEARANCE UR: CLEAR
BILIRUB UR STRIP.AUTO-MCNC: NEGATIVE MG/DL
BUN SERPL-MCNC: 23 MG/DL (ref 6–23)
CALCIUM SERPL-MCNC: 9.7 MG/DL (ref 8.6–10.6)
CHLORIDE SERPL-SCNC: 100 MMOL/L (ref 98–107)
CO2 SERPL-SCNC: 29 MMOL/L (ref 21–32)
COLOR UR: NORMAL
CREAT SERPL-MCNC: 1.61 MG/DL (ref 0.5–1.3)
EGFRCR SERPLBLD CKD-EPI 2021: 45 ML/MIN/1.73M*2
ERYTHROCYTE [DISTWIDTH] IN BLOOD BY AUTOMATED COUNT: 13.3 % (ref 11.5–14.5)
GLUCOSE SERPL-MCNC: 101 MG/DL (ref 74–99)
GLUCOSE UR STRIP.AUTO-MCNC: NORMAL MG/DL
HCT VFR BLD AUTO: 41 % (ref 41–52)
HGB BLD-MCNC: 13.4 G/DL (ref 13.5–17.5)
HOLD SPECIMEN: NORMAL
KETONES UR STRIP.AUTO-MCNC: NEGATIVE MG/DL
LEUKOCYTE ESTERASE UR QL STRIP.AUTO: NEGATIVE
MCH RBC QN AUTO: 32 PG (ref 26–34)
MCHC RBC AUTO-ENTMCNC: 32.7 G/DL (ref 32–36)
MCV RBC AUTO: 98 FL (ref 80–100)
NITRITE UR QL STRIP.AUTO: NEGATIVE
NRBC BLD-RTO: 0 /100 WBCS (ref 0–0)
PH UR STRIP.AUTO: 6 [PH]
PHOSPHATE SERPL-MCNC: 3 MG/DL (ref 2.5–4.9)
PLATELET # BLD AUTO: 116 X10*3/UL (ref 150–450)
POTASSIUM SERPL-SCNC: 4.1 MMOL/L (ref 3.5–5.3)
PROT UR STRIP.AUTO-MCNC: NEGATIVE MG/DL
RBC # BLD AUTO: 4.19 X10*6/UL (ref 4.5–5.9)
RBC # UR STRIP.AUTO: NEGATIVE MG/DL
SODIUM SERPL-SCNC: 137 MMOL/L (ref 136–145)
SP GR UR STRIP.AUTO: 1.02
TACROLIMUS BLD-MCNC: 10.5 NG/ML
UROBILINOGEN UR STRIP.AUTO-MCNC: NORMAL MG/DL
WBC # BLD AUTO: 4.6 X10*3/UL (ref 4.4–11.3)

## 2025-02-27 PROCEDURE — 85027 COMPLETE CBC AUTOMATED: CPT

## 2025-02-27 PROCEDURE — 80069 RENAL FUNCTION PANEL: CPT

## 2025-02-27 PROCEDURE — 80197 ASSAY OF TACROLIMUS: CPT

## 2025-02-27 PROCEDURE — 81003 URINALYSIS AUTO W/O SCOPE: CPT

## 2025-02-28 ENCOUNTER — TELEPHONE (OUTPATIENT)
Facility: HOSPITAL | Age: 75
End: 2025-02-28
Payer: MEDICARE

## 2025-02-28 DIAGNOSIS — Z94.0 KIDNEY REPLACED BY TRANSPLANT (HHS-HCC): ICD-10-CM

## 2025-02-28 NOTE — TELEPHONE ENCOUNTER
Called patient back to inquire about the timing of his Envarsus since his Tac level was 10.5 and it appears to be a true trough, on Envarsus 6 mg daily, previous levels 6.1, 5.3. LVM to c/b to confirm.

## 2025-02-28 NOTE — TELEPHONE ENCOUNTER
Patient called requesting to speak with coordinator about  retuning a missed call. PT is free to speak now. He takes his TAC at 9am .  Patient call back number is [phone] .

## 2025-02-28 NOTE — TELEPHONE ENCOUNTER
Spoke to patient, advised he normally takes his 6 mg of Envarsus at 9 am, he didn't take meds prior to labs, so true trough, prev levels at 6.2, 5.5. Reviewed with Dr. Austin, will repeat tac next week on Monday.  Patient agreed to plan. Order placed.

## 2025-03-03 ENCOUNTER — LAB (OUTPATIENT)
Dept: LAB | Facility: HOSPITAL | Age: 75
End: 2025-03-03
Payer: MEDICARE

## 2025-03-03 DIAGNOSIS — Z94.0 KIDNEY TRANSPLANT STATUS: Primary | ICD-10-CM

## 2025-03-03 LAB — TACROLIMUS BLD-MCNC: 6.6 NG/ML

## 2025-03-03 PROCEDURE — 80197 ASSAY OF TACROLIMUS: CPT

## 2025-03-13 ENCOUNTER — PHARMACY VISIT (OUTPATIENT)
Dept: PHARMACY | Facility: CLINIC | Age: 75
End: 2025-03-13
Payer: COMMERCIAL

## 2025-03-13 ENCOUNTER — SPECIALTY PHARMACY (OUTPATIENT)
Dept: PHARMACY | Facility: CLINIC | Age: 75
End: 2025-03-13

## 2025-03-13 PROCEDURE — RXMED WILLOW AMBULATORY MEDICATION CHARGE

## 2025-03-26 ENCOUNTER — OFFICE VISIT (OUTPATIENT)
Dept: TRANSPLANT | Facility: CLINIC | Age: 75
End: 2025-03-26
Payer: MEDICARE

## 2025-03-26 VITALS
OXYGEN SATURATION: 100 % | SYSTOLIC BLOOD PRESSURE: 130 MMHG | WEIGHT: 195.2 LBS | DIASTOLIC BLOOD PRESSURE: 72 MMHG | BODY MASS INDEX: 31.51 KG/M2 | HEART RATE: 74 BPM | TEMPERATURE: 97.3 F

## 2025-03-26 DIAGNOSIS — B34.8 BK VIREMIA: ICD-10-CM

## 2025-03-26 DIAGNOSIS — Z94.0 IMMUNOSUPPRESSIVE MANAGEMENT ENCOUNTER FOLLOWING KIDNEY TRANSPLANT: ICD-10-CM

## 2025-03-26 DIAGNOSIS — R79.89 ELEVATED SERUM CREATININE: ICD-10-CM

## 2025-03-26 DIAGNOSIS — Z94.0 KIDNEY REPLACED BY TRANSPLANT (HHS-HCC): Primary | ICD-10-CM

## 2025-03-26 DIAGNOSIS — Z79.899 IMMUNOSUPPRESSIVE MANAGEMENT ENCOUNTER FOLLOWING KIDNEY TRANSPLANT: ICD-10-CM

## 2025-03-26 PROCEDURE — 99214 OFFICE O/P EST MOD 30 MIN: CPT

## 2025-03-26 PROCEDURE — 1157F ADVNC CARE PLAN IN RCRD: CPT

## 2025-03-26 PROCEDURE — 1126F AMNT PAIN NOTED NONE PRSNT: CPT

## 2025-03-26 ASSESSMENT — PAIN SCALES - GENERAL: PAINLEVEL_OUTOF10: 0-NO PAIN

## 2025-03-26 NOTE — PATIENT INSTRUCTIONS
Hydrate  Repeat lab 4/1 with allosure and BK virus  Provide allosure kit today  Next follow up 3-4 months
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
## 2025-04-01 ENCOUNTER — LAB (OUTPATIENT)
Dept: LAB | Facility: HOSPITAL | Age: 75
End: 2025-04-01
Payer: MEDICARE

## 2025-04-01 ENCOUNTER — APPOINTMENT (OUTPATIENT)
Dept: LAB | Facility: HOSPITAL | Age: 75
End: 2025-04-01
Payer: MEDICARE

## 2025-04-01 DIAGNOSIS — B34.8 BK VIREMIA: ICD-10-CM

## 2025-04-01 DIAGNOSIS — Z79.899 IMMUNOSUPPRESSIVE MANAGEMENT ENCOUNTER FOLLOWING KIDNEY TRANSPLANT: ICD-10-CM

## 2025-04-01 DIAGNOSIS — Z94.0 IMMUNOSUPPRESSIVE MANAGEMENT ENCOUNTER FOLLOWING KIDNEY TRANSPLANT: ICD-10-CM

## 2025-04-01 DIAGNOSIS — Z94.0 KIDNEY REPLACED BY TRANSPLANT (HHS-HCC): ICD-10-CM

## 2025-04-01 DIAGNOSIS — Z94.0 KIDNEY TRANSPLANT STATUS: Primary | ICD-10-CM

## 2025-04-01 LAB
ALBUMIN SERPL BCP-MCNC: 4 G/DL (ref 3.4–5)
ANION GAP SERPL CALC-SCNC: 14 MMOL/L (ref 10–20)
BUN SERPL-MCNC: 21 MG/DL (ref 6–23)
CALCIUM SERPL-MCNC: 9.4 MG/DL (ref 8.6–10.6)
CHLORIDE SERPL-SCNC: 100 MMOL/L (ref 98–107)
CO2 SERPL-SCNC: 28 MMOL/L (ref 21–32)
CREAT SERPL-MCNC: 1.54 MG/DL (ref 0.5–1.3)
EGFRCR SERPLBLD CKD-EPI 2021: 48 ML/MIN/1.73M*2
ERYTHROCYTE [DISTWIDTH] IN BLOOD BY AUTOMATED COUNT: 13.2 % (ref 11.5–14.5)
GLUCOSE SERPL-MCNC: 98 MG/DL (ref 74–99)
HCT VFR BLD AUTO: 43.4 % (ref 41–52)
HGB BLD-MCNC: 13.6 G/DL (ref 13.5–17.5)
MAGNESIUM SERPL-MCNC: 1.77 MG/DL (ref 1.6–2.4)
MCH RBC QN AUTO: 31.3 PG (ref 26–34)
MCHC RBC AUTO-ENTMCNC: 31.3 G/DL (ref 32–36)
MCV RBC AUTO: 100 FL (ref 80–100)
NRBC BLD-RTO: 0 /100 WBCS (ref 0–0)
PHOSPHATE SERPL-MCNC: 3.3 MG/DL (ref 2.5–4.9)
PLATELET # BLD AUTO: 123 X10*3/UL (ref 150–450)
POTASSIUM SERPL-SCNC: 3.9 MMOL/L (ref 3.5–5.3)
RBC # BLD AUTO: 4.34 X10*6/UL (ref 4.5–5.9)
SODIUM SERPL-SCNC: 138 MMOL/L (ref 136–145)
TACROLIMUS BLD-MCNC: 8.6 NG/ML
WBC # BLD AUTO: 4.4 X10*3/UL (ref 4.4–11.3)

## 2025-04-01 PROCEDURE — 87799 DETECT AGENT NOS DNA QUANT: CPT

## 2025-04-01 PROCEDURE — 83735 ASSAY OF MAGNESIUM: CPT

## 2025-04-01 PROCEDURE — 80069 RENAL FUNCTION PANEL: CPT

## 2025-04-01 PROCEDURE — 80197 ASSAY OF TACROLIMUS: CPT

## 2025-04-01 PROCEDURE — 85027 COMPLETE CBC AUTOMATED: CPT

## 2025-04-02 LAB
BKV DNA SERPL NAA+PROBE-ACNC: 25 IU/ML
BKV DNA SERPL NAA+PROBE-LOG#: 1.4 LOG IU/ML
LABORATORY COMMENT REPORT: DETECTED

## 2025-04-03 LAB
ALLOSURE SCORE - KIDNEY: 0.13 %
CAREDX_ORDER_ID: NORMAL
CENTER_ORDER_ID: NORMAL
CLIENT SPECIMEN ID - ALLOSURE: NORMAL
DONOR RELATION - ALLOSURE: NORMAL
NOTES - ALLOSURE: NORMAL
RELATIVE CHANGE VALUE - KIDNEY: NORMAL
TEST COMMENTS - ALLOSURE: NORMAL
TIME POST TX - ALLOSURE: NORMAL
TRANSPLANTED ORGAN - ALLOSURE: NORMAL
TX DATE - ALLOSURE/ALLOMAP: NORMAL
WP_ORDER_ID: NORMAL

## 2025-04-08 ENCOUNTER — SPECIALTY PHARMACY (OUTPATIENT)
Dept: PHARMACY | Facility: CLINIC | Age: 75
End: 2025-04-08

## 2025-04-08 PROCEDURE — RXMED WILLOW AMBULATORY MEDICATION CHARGE

## 2025-04-10 ENCOUNTER — PHARMACY VISIT (OUTPATIENT)
Dept: PHARMACY | Facility: CLINIC | Age: 75
End: 2025-04-10
Payer: COMMERCIAL

## 2025-04-11 DIAGNOSIS — Z94.0 KIDNEY REPLACED BY TRANSPLANT (HHS-HCC): ICD-10-CM

## 2025-04-11 DIAGNOSIS — Z79.899 IMMUNOSUPPRESSIVE MANAGEMENT ENCOUNTER FOLLOWING KIDNEY TRANSPLANT: ICD-10-CM

## 2025-04-11 DIAGNOSIS — I10 ESSENTIAL HYPERTENSION: ICD-10-CM

## 2025-04-11 DIAGNOSIS — Z94.0 IMMUNOSUPPRESSIVE MANAGEMENT ENCOUNTER FOLLOWING KIDNEY TRANSPLANT: ICD-10-CM

## 2025-04-11 DIAGNOSIS — B34.8 BK VIREMIA: ICD-10-CM

## 2025-04-11 RX ORDER — ASPIRIN 81 MG/1
81 TABLET ORAL DAILY
Qty: 90 TABLET | Refills: 0 | Status: SHIPPED | OUTPATIENT
Start: 2025-04-11

## 2025-05-07 ENCOUNTER — TELEPHONE (OUTPATIENT)
Facility: HOSPITAL | Age: 75
End: 2025-05-07
Payer: MEDICARE

## 2025-05-07 ENCOUNTER — TELEPHONE (OUTPATIENT)
Dept: TRANSPLANT | Facility: CLINIC | Age: 75
End: 2025-05-07
Payer: MEDICARE

## 2025-05-07 PROCEDURE — RXMED WILLOW AMBULATORY MEDICATION CHARGE

## 2025-05-07 NOTE — TELEPHONE ENCOUNTER
Patient called requesting to speak with coordinator about needing a heart cath.  Patient call back number is [phone] .

## 2025-05-08 ENCOUNTER — PHARMACY VISIT (OUTPATIENT)
Dept: PHARMACY | Facility: CLINIC | Age: 75
End: 2025-05-08
Payer: COMMERCIAL

## 2025-05-08 ENCOUNTER — LAB (OUTPATIENT)
Dept: LAB | Facility: HOSPITAL | Age: 75
End: 2025-05-08
Payer: MEDICARE

## 2025-05-08 DIAGNOSIS — Z94.0 KIDNEY REPLACED BY TRANSPLANT (HHS-HCC): ICD-10-CM

## 2025-05-08 LAB
ALBUMIN SERPL BCP-MCNC: 4.1 G/DL (ref 3.4–5)
ANION GAP SERPL CALC-SCNC: 11 MMOL/L (ref 10–20)
BUN SERPL-MCNC: 24 MG/DL (ref 6–23)
CALCIUM SERPL-MCNC: 9.7 MG/DL (ref 8.6–10.6)
CHLORIDE SERPL-SCNC: 103 MMOL/L (ref 98–107)
CO2 SERPL-SCNC: 30 MMOL/L (ref 21–32)
CREAT SERPL-MCNC: 1.5 MG/DL (ref 0.5–1.3)
EGFRCR SERPLBLD CKD-EPI 2021: 49 ML/MIN/1.73M*2
ERYTHROCYTE [DISTWIDTH] IN BLOOD BY AUTOMATED COUNT: 13.4 % (ref 11.5–14.5)
GLUCOSE SERPL-MCNC: 116 MG/DL (ref 74–99)
HCT VFR BLD AUTO: 44.9 % (ref 41–52)
HGB BLD-MCNC: 13.9 G/DL (ref 13.5–17.5)
MAGNESIUM SERPL-MCNC: 1.65 MG/DL (ref 1.6–2.4)
MCH RBC QN AUTO: 31.2 PG (ref 26–34)
MCHC RBC AUTO-ENTMCNC: 31 G/DL (ref 32–36)
MCV RBC AUTO: 101 FL (ref 80–100)
NRBC BLD-RTO: 0 /100 WBCS (ref 0–0)
PHOSPHATE SERPL-MCNC: 3.9 MG/DL (ref 2.5–4.9)
PLATELET # BLD AUTO: 114 X10*3/UL (ref 150–450)
POTASSIUM SERPL-SCNC: 4.3 MMOL/L (ref 3.5–5.3)
RBC # BLD AUTO: 4.45 X10*6/UL (ref 4.5–5.9)
SODIUM SERPL-SCNC: 140 MMOL/L (ref 136–145)
TACROLIMUS BLD-MCNC: 5.4 NG/ML
WBC # BLD AUTO: 4.7 X10*3/UL (ref 4.4–11.3)

## 2025-05-08 PROCEDURE — 85027 COMPLETE CBC AUTOMATED: CPT

## 2025-05-08 PROCEDURE — 87799 DETECT AGENT NOS DNA QUANT: CPT

## 2025-05-08 PROCEDURE — 80069 RENAL FUNCTION PANEL: CPT

## 2025-05-08 PROCEDURE — 80197 ASSAY OF TACROLIMUS: CPT

## 2025-05-08 PROCEDURE — 83735 ASSAY OF MAGNESIUM: CPT

## 2025-05-08 NOTE — TELEPHONE ENCOUNTER
Returned pts call- let him know Dr canela ok'd him to get dye for heart cath. Pt had NFQ at this time

## 2025-05-08 NOTE — TELEPHONE ENCOUNTER
Patient called requesting to speak with coordinator about needing a heart cath.  Patient call back number is [phone] .

## 2025-05-08 NOTE — TELEPHONE ENCOUNTER
Called Dr Camarillo's office, left VM with contact info & stating dr canela ok'd pt to get dye with heart cath. Asked office to call us back with further questions

## 2025-05-09 LAB
BKV DNA SERPL NAA+PROBE-ACNC: 96 IU/ML (ref ?–22)
BKV DNA SERPL NAA+PROBE-LOG#: 1.98 LOG IU/ML
LABORATORY COMMENT REPORT: DETECTED

## 2025-05-12 ENCOUNTER — TELEPHONE (OUTPATIENT)
Facility: HOSPITAL | Age: 75
End: 2025-05-12
Payer: MEDICARE

## 2025-05-12 NOTE — TELEPHONE ENCOUNTER
Patient called requesting to speak with coordinator about seeking approval for a heart cath with contrast. Currently at [facility] .  Patient call back number is [phone] .

## 2025-05-12 NOTE — TELEPHONE ENCOUNTER
Called patient back,  per patient he is currently inpatient for chest pain but prior to this episode he was going to have an outpatient heartcath because of angina and increased use of nitroglycerin.  His cardiologist Dr. Turner reached out to Dr. Dodson who gave the authorization and the inpatient team at [facility] was able to get that authorization from Dr. Turner's office to complete the cath inpatient.

## 2025-05-19 ENCOUNTER — HOSPITAL ENCOUNTER (OUTPATIENT)
Dept: VASCULAR MEDICINE | Facility: CLINIC | Age: 75
Discharge: HOME | End: 2025-05-19
Payer: MEDICARE

## 2025-05-19 ENCOUNTER — HOSPITAL ENCOUNTER (OUTPATIENT)
Dept: RADIOLOGY | Facility: CLINIC | Age: 75
Discharge: HOME | End: 2025-05-19
Payer: MEDICARE

## 2025-05-19 DIAGNOSIS — I73.9 PAD (PERIPHERAL ARTERY DISEASE): ICD-10-CM

## 2025-05-19 DIAGNOSIS — I73.9 CLAUDICATION: ICD-10-CM

## 2025-05-19 PROCEDURE — 74176 CT ABD & PELVIS W/O CONTRAST: CPT

## 2025-05-19 PROCEDURE — 93922 UPR/L XTREMITY ART 2 LEVELS: CPT | Performed by: STUDENT IN AN ORGANIZED HEALTH CARE EDUCATION/TRAINING PROGRAM

## 2025-05-19 PROCEDURE — 93922 UPR/L XTREMITY ART 2 LEVELS: CPT

## 2025-05-21 DIAGNOSIS — Z94.0 KIDNEY REPLACED BY TRANSPLANT (HHS-HCC): ICD-10-CM

## 2025-05-21 DIAGNOSIS — Z79.899 IMMUNOSUPPRESSIVE MANAGEMENT ENCOUNTER FOLLOWING KIDNEY TRANSPLANT: ICD-10-CM

## 2025-05-21 DIAGNOSIS — I10 ESSENTIAL HYPERTENSION: ICD-10-CM

## 2025-05-21 DIAGNOSIS — Z94.0 IMMUNOSUPPRESSIVE MANAGEMENT ENCOUNTER FOLLOWING KIDNEY TRANSPLANT: ICD-10-CM

## 2025-05-21 DIAGNOSIS — B34.8 BK VIREMIA: ICD-10-CM

## 2025-05-22 ENCOUNTER — HOSPITAL ENCOUNTER (OUTPATIENT)
Dept: RADIOLOGY | Facility: CLINIC | Age: 75
End: 2025-05-22
Payer: MEDICARE

## 2025-05-22 ENCOUNTER — HOSPITAL ENCOUNTER (OUTPATIENT)
Dept: VASCULAR MEDICINE | Facility: CLINIC | Age: 75
Discharge: HOME | End: 2025-05-22
Payer: MEDICARE

## 2025-05-22 DIAGNOSIS — I73.9 PAD (PERIPHERAL ARTERY DISEASE): ICD-10-CM

## 2025-05-22 DIAGNOSIS — I72.3 ILIAC ANEURYSM: ICD-10-CM

## 2025-05-22 DIAGNOSIS — I73.9 CLAUDICATION: ICD-10-CM

## 2025-05-22 PROCEDURE — 93978 VASCULAR STUDY: CPT

## 2025-05-22 PROCEDURE — 93978 VASCULAR STUDY: CPT | Performed by: SURGERY

## 2025-05-22 RX ORDER — LOSARTAN POTASSIUM 25 MG/1
25 TABLET ORAL 2 TIMES DAILY
Qty: 180 TABLET | Refills: 3 | Status: SHIPPED | OUTPATIENT
Start: 2025-05-22 | End: 2026-05-22

## 2025-05-23 DIAGNOSIS — Z94.0 KIDNEY REPLACED BY TRANSPLANT (HHS-HCC): ICD-10-CM

## 2025-05-23 DIAGNOSIS — B34.8 BK VIREMIA: ICD-10-CM

## 2025-05-23 DIAGNOSIS — I10 ESSENTIAL HYPERTENSION: ICD-10-CM

## 2025-05-23 DIAGNOSIS — Z79.899 IMMUNOSUPPRESSIVE MANAGEMENT ENCOUNTER FOLLOWING KIDNEY TRANSPLANT: ICD-10-CM

## 2025-05-23 DIAGNOSIS — Z94.0 IMMUNOSUPPRESSIVE MANAGEMENT ENCOUNTER FOLLOWING KIDNEY TRANSPLANT: ICD-10-CM

## 2025-05-24 RX ORDER — MYCOPHENOLATE MOFETIL 250 MG/1
250 CAPSULE ORAL 2 TIMES DAILY
Qty: 60 CAPSULE | Refills: 11 | Status: SHIPPED | OUTPATIENT
Start: 2025-05-24 | End: 2026-05-24

## 2025-05-24 RX ORDER — PREDNISONE 5 MG/1
5 TABLET ORAL DAILY
Qty: 30 TABLET | Refills: 11 | Status: SHIPPED | OUTPATIENT
Start: 2025-05-24 | End: 2026-05-24

## 2025-05-29 ENCOUNTER — APPOINTMENT (OUTPATIENT)
Dept: VASCULAR SURGERY | Facility: CLINIC | Age: 75
End: 2025-05-29
Payer: MEDICARE

## 2025-05-29 PROCEDURE — RXMED WILLOW AMBULATORY MEDICATION CHARGE

## 2025-05-30 PROCEDURE — RXMED WILLOW AMBULATORY MEDICATION CHARGE

## 2025-06-02 ENCOUNTER — OFFICE VISIT (OUTPATIENT)
Dept: VASCULAR SURGERY | Facility: CLINIC | Age: 75
End: 2025-06-02
Payer: MEDICARE

## 2025-06-02 ENCOUNTER — LAB (OUTPATIENT)
Dept: LAB | Facility: HOSPITAL | Age: 75
End: 2025-06-02
Payer: MEDICARE

## 2025-06-02 VITALS
SYSTOLIC BLOOD PRESSURE: 131 MMHG | DIASTOLIC BLOOD PRESSURE: 76 MMHG | OXYGEN SATURATION: 96 % | BODY MASS INDEX: 32.14 KG/M2 | HEART RATE: 73 BPM | WEIGHT: 200 LBS | HEIGHT: 66 IN

## 2025-06-02 DIAGNOSIS — I72.3 ILIAC ARTERY ANEURYSM, LEFT: Primary | ICD-10-CM

## 2025-06-02 DIAGNOSIS — I72.3 ILIAC ANEURYSM: ICD-10-CM

## 2025-06-02 DIAGNOSIS — I73.9 PAD (PERIPHERAL ARTERY DISEASE): ICD-10-CM

## 2025-06-02 DIAGNOSIS — Z94.0 KIDNEY REPLACED BY TRANSPLANT (HHS-HCC): ICD-10-CM

## 2025-06-02 DIAGNOSIS — R93.5 ABNORMAL CT OF THE ABDOMEN: ICD-10-CM

## 2025-06-02 LAB
ALBUMIN SERPL BCP-MCNC: 4 G/DL (ref 3.4–5)
ANION GAP SERPL CALC-SCNC: 12 MMOL/L (ref 10–20)
BUN SERPL-MCNC: 19 MG/DL (ref 6–23)
CALCIUM SERPL-MCNC: 9.8 MG/DL (ref 8.6–10.6)
CHLORIDE SERPL-SCNC: 98 MMOL/L (ref 98–107)
CO2 SERPL-SCNC: 31 MMOL/L (ref 21–32)
CREAT SERPL-MCNC: 1.57 MG/DL (ref 0.5–1.3)
EGFRCR SERPLBLD CKD-EPI 2021: 47 ML/MIN/1.73M*2
ERYTHROCYTE [DISTWIDTH] IN BLOOD BY AUTOMATED COUNT: 13.6 % (ref 11.5–14.5)
GLUCOSE SERPL-MCNC: 89 MG/DL (ref 74–99)
HCT VFR BLD AUTO: 41.4 % (ref 41–52)
HGB BLD-MCNC: 12.7 G/DL (ref 13.5–17.5)
MAGNESIUM SERPL-MCNC: 1.57 MG/DL (ref 1.6–2.4)
MCH RBC QN AUTO: 31.1 PG (ref 26–34)
MCHC RBC AUTO-ENTMCNC: 30.7 G/DL (ref 32–36)
MCV RBC AUTO: 101 FL (ref 80–100)
NRBC BLD-RTO: 0 /100 WBCS (ref 0–0)
PHOSPHATE SERPL-MCNC: 3.3 MG/DL (ref 2.5–4.9)
PLATELET # BLD AUTO: 149 X10*3/UL (ref 150–450)
POTASSIUM SERPL-SCNC: 4.3 MMOL/L (ref 3.5–5.3)
RBC # BLD AUTO: 4.09 X10*6/UL (ref 4.5–5.9)
SODIUM SERPL-SCNC: 137 MMOL/L (ref 136–145)
TACROLIMUS BLD-MCNC: 8.6 NG/ML
WBC # BLD AUTO: 4.8 X10*3/UL (ref 4.4–11.3)

## 2025-06-02 PROCEDURE — 1159F MED LIST DOCD IN RCRD: CPT | Performed by: NURSE PRACTITIONER

## 2025-06-02 PROCEDURE — 3008F BODY MASS INDEX DOCD: CPT | Performed by: NURSE PRACTITIONER

## 2025-06-02 PROCEDURE — 83735 ASSAY OF MAGNESIUM: CPT

## 2025-06-02 PROCEDURE — 99214 OFFICE O/P EST MOD 30 MIN: CPT | Performed by: NURSE PRACTITIONER

## 2025-06-02 PROCEDURE — 80069 RENAL FUNCTION PANEL: CPT

## 2025-06-02 PROCEDURE — 80197 ASSAY OF TACROLIMUS: CPT

## 2025-06-02 PROCEDURE — 85027 COMPLETE CBC AUTOMATED: CPT

## 2025-06-02 PROCEDURE — 87799 DETECT AGENT NOS DNA QUANT: CPT

## 2025-06-02 RX ORDER — CLOPIDOGREL BISULFATE 75 MG/1
75 TABLET ORAL DAILY
COMMUNITY

## 2025-06-02 ASSESSMENT — ENCOUNTER SYMPTOMS
GASTROINTESTINAL NEGATIVE: 1
PALPITATIONS: 0
CHEST TIGHTNESS: 0
SHORTNESS OF BREATH: 0
PSYCHIATRIC NEGATIVE: 1
EYES NEGATIVE: 1
ENDOCRINE NEGATIVE: 1
APPETITE CHANGE: 0
HEMATOLOGIC/LYMPHATIC NEGATIVE: 1
NEUROLOGICAL NEGATIVE: 1
ACTIVITY CHANGE: 0
ALLERGIC/IMMUNOLOGIC NEGATIVE: 1
UNEXPECTED WEIGHT CHANGE: 0

## 2025-06-02 NOTE — PROGRESS NOTES
F/U REASON: pelvic arterial aneurysm follow-up and surveillance status post intervention    CURRENT ENCOUNTER:  Abel Hayes is 74 y.o. male here for follow up of pelvic arterial aneurysm follow-up and surveillance status post intervention    Today he has no complaints, he is feeling well.       Meds:   Current Medications[1]    Allergies:   RX Allergies[2]    ROS:  Review of Systems   Constitutional:  Negative for activity change, appetite change and unexpected weight change.   HENT: Negative.     Eyes: Negative.    Respiratory:  Negative for chest tightness and shortness of breath.    Cardiovascular:  Negative for chest pain and palpitations.   Gastrointestinal: Negative.    Endocrine: Negative.    Genitourinary: Negative.    Skin: Negative.    Allergic/Immunologic: Negative.    Neurological: Negative.    Hematological: Negative.    Psychiatric/Behavioral: Negative.       otherwise unremarkable    Objective:  Vitals:  Vitals:    06/02/25 1500   BP: 131/76   Pulse: 73   SpO2: 96%      Physical Exam  HENT:      Head: Normocephalic and atraumatic.      Nose: Nose normal.      Mouth/Throat:      Mouth: Mucous membranes are moist.   Eyes:      Conjunctiva/sclera: Conjunctivae normal.   Cardiovascular:      Rate and Rhythm: Normal rate.      Pulses: Normal pulses.      Comments: +thrill to LUE  Pulmonary:      Effort: Pulmonary effort is normal.   Musculoskeletal:         General: Normal range of motion.      Cervical back: Normal range of motion.   Skin:     General: Skin is warm and dry.      Capillary Refill: Capillary refill takes less than 2 seconds.   Neurological:      General: No focal deficit present.      Mental Status: He is alert and oriented to person, place, and time.   Psychiatric:         Mood and Affect: Mood normal.         Behavior: Behavior normal.         Thought Content: Thought content normal.         Judgment: Judgment normal.     Labs:  Lab Results   Component Value Date    WBC 4.8 06/02/2025     WBC 4.7 05/08/2025    WBC 4.4 04/01/2025    HGB 12.7 (L) 06/02/2025    HGB 13.9 05/08/2025    HGB 13.6 04/01/2025    HCT 41.4 06/02/2025    HCT 44.9 05/08/2025    HCT 43.4 04/01/2025     (H) 06/02/2025     (H) 05/08/2025     04/01/2025     (L) 06/02/2025     Lab Results   Component Value Date    CREATININE 1.57 (H) 06/02/2025    CREATININE 1.50 (H) 05/08/2025    CREATININE 1.54 (H) 04/01/2025    BUN 19 06/02/2025    BUN 24 (H) 05/08/2025    BUN 21 04/01/2025     06/02/2025     05/08/2025     04/01/2025    K 4.3 06/02/2025    K 4.3 05/08/2025    K 3.9 04/01/2025    CL 98 06/02/2025     05/08/2025     04/01/2025    CO2 31 06/02/2025    CO2 30 05/08/2025    CO2 28 04/01/2025       Imaging:                    Assessment & Plan:  Abel Hayes is 74 y.o. male with history of b/l hypogastric aneurysms, asymptomatic complicated medical history including ESRD on HD secondary renal cell carcinoma s/p radial b/l nephrectomy, h/o right pelvic renal transplant, h/o ventral hernia repair with synthetic mesh  No note he is a [employer]hova's witness  h/o multiple PCI leading to CABG 4V requiring 1 stent post in 2017, on aspirin, h/o PD catheter dialysis     S/P 9-  OPERATION/PROCEDURE:  1. Ultrasound-guided access to the right common femoral artery.  2. Aortography with catheter placement into the aorta with multiple views, selective, for the right pelvis and the left pelvis.     OPERATION/PROCEDURE: 10/21/19  1. Left high brachial artery cutdown for retrograde access and aortography with special right pelvic views.  2. Final catheter placement in the second order right hypogastric artery branches.  3. Right second and third order hypogastric artery coil embolization with 6 Ana, and another branch with an 8 and a 10 Ana coil embolization.  4. Proximal coil embolization of the origin of the right hypogastric artery with an Amplatzer vascular plug II 16  mm  diameter.  5. Primary repair of left brachial artery     PROCEDURE ON 2/3/20:  OPERATION/PROCEDURE:  1. Ultrasound-guided access to the right common femoral artery for aortography and right lower extremity angiography as well as third-order plus selective catheterization of the right hypogastric artery anterior and posterior branches.  2. Coil embolization of the outflow branches of the third-order hypogastric artery branches with a total of 8 Concerto coils (three 4 mm coils, three 6 mm coils, and two 9 mm coils).  3. Right common iliac and external iliac artery stenting with a [facility] Viabahn VBX balloon expandable stent, 11 x 79 mm long (distal stent dilated to 11 mm, the mid stent dilated to 16 mm, and the proximal stent to 14 mm).  4. ProGlide closure, right common femoral artery    2002 -renal transplant  2020- 2nd renal transplant     5/14/25: [facility] with PCI to RCA with ZACH placement    It was a pleasure taking care of you today and appreciate your seeing us at our [facility] [facility].     Today's plan is as follows:  1) Your follow-up with Dr. Rm for surveillance should be scheduled with a CT and ultrasounds prior to appointment in 12 months. No eating for 8 hours prior to ultrasound.   2) Continue with daily aspirin and statin    Patient imagine and plan of care discussed with Dr. Rm     >45mins spent reviewing EMR, exam, imaging, education, collaboration with Dr Rm & plan of care    Kisha Morales, OMKAR, APRN-CNP, AGPCNP-C, FNP-C, AGACNP-BC  Senior Nurse Practitioner, Vascular Surgery  [facility], [facility] [place]  [address], Office [phone]             [1]   Current Outpatient Medications:     albuterol 2.5 mg /3 mL (0.083 %) nebulizer solution, Inhale., Disp: , Rfl:     aspirin 81 mg EC tablet, TAKE  ONE TABLET BY MOUTH once a day, Disp: 90 tablet, Rfl: 0    atorvastatin (Lipitor) 40 mg tablet, Take 1 tablet (40 mg) by mouth once daily., Disp: , Rfl:     clopidogrel (Plavix) 75 mg tablet, Take 1 tablet (75 mg) by mouth once daily., Disp: , Rfl:     fluticasone propion-salmeteroL (Advair Diskus) 250-50 mcg/dose diskus inhaler, INHALE ONE PUFF BY MOUTH TWO TIMES DAILY, Disp: , Rfl:     ipratropium-albuteroL (Duo-Neb) 0.5-2.5 mg/3 mL nebulizer solution, INHALE CONTENTS OF ONE VIAL VIA NEBULIZER THREE TIMES DAILY, Disp: , Rfl:     losartan (Cozaar) 25 mg tablet, Take 1 tablet (25 mg) by mouth 2 times a day., Disp: 180 tablet, Rfl: 3    metoprolol tartrate (Lopressor) 50 mg tablet, Take 1 tablet by mouth 2 times a day., Disp: , Rfl:     mycophenolate (Cellcept) 250 mg capsule, Take 1 capsule (250 mg) by mouth 2 times a day., Disp: 60 capsule, Rfl: 11    nitroglycerin (Nitrostat) 0.4 mg SL tablet, PLACE 1 TABLET UNDER THE TOUNGE EVERY 5 MINUTES AS NEEDED FOR CHEST PAIN  NOT TO EXCEED 3 DOSES, Disp: , Rfl:     pantoprazole (ProtoNix) 40 mg EC tablet, Take 1 tablet (40 mg) by mouth once daily., Disp: , Rfl:     predniSONE (Deltasone) 5 mg tablet, Take 1 tablet (5 mg) by mouth once daily., Disp: 30 tablet, Rfl: 11    tacrolimus ER (Envarsus XR) 1 mg tablet ER, Take 2 tablets (2 mg) by mouth once daily. Total dose 6 mg once a day, Disp: 60 tablet, Rfl: 11    tacrolimus ER (Envarsus XR) 4 mg tablet ER, Take 1 tablet (4 mg) by mouth once daily., Disp: 30 tablet, Rfl: 11    cloNIDine (Catapres-TTS-1) 0.1 mg/24 hr patch, Apply one patch on the skin and replace every 7 days, as directed, Disp: 4 patch, Rfl: 11    Eliquis 2.5 mg tablet, Take 1 tablet (2.5 mg) by mouth 2 times a day. (Patient not taking: Reported on 6/2/2025), Disp: , Rfl:     furosemide (Lasix) 40 mg tablet, Take 1 tablet (40 mg) by mouth once daily., Disp: 30 tablet, Rfl: 11  [2]   Allergies  Allergen Reactions    Terbinafine Itching    Niacin Itching

## 2025-06-03 ENCOUNTER — SPECIALTY PHARMACY (OUTPATIENT)
Dept: PHARMACY | Facility: CLINIC | Age: 75
End: 2025-06-03

## 2025-06-03 LAB
BKV DNA SERPL NAA+PROBE-LOG#: NORMAL {LOG_COPIES}/ML
LABORATORY COMMENT REPORT: NOT DETECTED

## 2025-06-03 PROCEDURE — RXMED WILLOW AMBULATORY MEDICATION CHARGE

## 2025-06-03 NOTE — PATIENT INSTRUCTIONS
It was a pleasure taking care of you today and appreciate your seeing us at our [facility] [place] [facility].     Today's plan is as follows:  1) Your follow-up with Dr. Rm for surveillance should be scheduled with a CT and ultrasounds prior to appointment in 12 months. No eating for 8 hours prior to ultrasound.     Please call the office with any questions at [phone].   You can speak to our secretaries or our clinical nurses for specific questions.   For [facility] specific questions, you can also call [phone] or email at [email or web address]  If you need coordinating your appointments and testing you can do these at the  or by calling my office shortly after your visit.

## 2025-06-04 ENCOUNTER — PHARMACY VISIT (OUTPATIENT)
Dept: PHARMACY | Facility: CLINIC | Age: 75
End: 2025-06-04
Payer: COMMERCIAL

## 2025-06-18 ENCOUNTER — OFFICE VISIT (OUTPATIENT)
Dept: TRANSPLANT | Facility: CLINIC | Age: 75
End: 2025-06-18
Payer: MEDICARE

## 2025-06-18 VITALS
BODY MASS INDEX: 31.8 KG/M2 | TEMPERATURE: 97.2 F | HEART RATE: 75 BPM | DIASTOLIC BLOOD PRESSURE: 53 MMHG | WEIGHT: 197 LBS | SYSTOLIC BLOOD PRESSURE: 124 MMHG | OXYGEN SATURATION: 100 %

## 2025-06-18 DIAGNOSIS — Z91.89 AT RISK FOR SECONDARY INFECTION: Primary | ICD-10-CM

## 2025-06-18 DIAGNOSIS — Z79.899 IMMUNOSUPPRESSIVE MANAGEMENT ENCOUNTER FOLLOWING KIDNEY TRANSPLANT: ICD-10-CM

## 2025-06-18 DIAGNOSIS — B34.8 BK VIREMIA: ICD-10-CM

## 2025-06-18 DIAGNOSIS — Z94.0 KIDNEY REPLACED BY TRANSPLANT (HHS-HCC): ICD-10-CM

## 2025-06-18 DIAGNOSIS — I10 ESSENTIAL HYPERTENSION: ICD-10-CM

## 2025-06-18 DIAGNOSIS — Z94.0 IMMUNOSUPPRESSIVE MANAGEMENT ENCOUNTER FOLLOWING KIDNEY TRANSPLANT: ICD-10-CM

## 2025-06-18 DIAGNOSIS — Z79.899 ENCOUNTER FOR LONG-TERM (CURRENT) USE OF HIGH-RISK MEDICATION: ICD-10-CM

## 2025-06-18 DIAGNOSIS — Z48.298 AFTERCARE FOLLOWING ORGAN TRANSPLANT: ICD-10-CM

## 2025-06-18 PROCEDURE — 3074F SYST BP LT 130 MM HG: CPT | Performed by: INTERNAL MEDICINE

## 2025-06-18 PROCEDURE — 99215 OFFICE O/P EST HI 40 MIN: CPT

## 2025-06-18 PROCEDURE — G2211 COMPLEX E/M VISIT ADD ON: HCPCS

## 2025-06-18 PROCEDURE — 3078F DIAST BP <80 MM HG: CPT | Performed by: INTERNAL MEDICINE

## 2025-06-18 PROCEDURE — 1126F AMNT PAIN NOTED NONE PRSNT: CPT | Performed by: INTERNAL MEDICINE

## 2025-06-18 RX ORDER — LOSARTAN POTASSIUM 25 MG/1
25 TABLET ORAL DAILY
Qty: 90 TABLET | Refills: 3 | Status: SHIPPED | OUTPATIENT
Start: 2025-06-18 | End: 2026-06-18

## 2025-06-18 RX ORDER — MELATONIN 10 MG/ML
2000 DROPS ORAL DAILY
Qty: 90 TABLET | Refills: 3 | Status: SHIPPED | OUTPATIENT
Start: 2025-06-18 | End: 2026-06-18

## 2025-06-18 RX ORDER — FUROSEMIDE 40 MG/1
40 TABLET ORAL
Qty: 180 TABLET | Refills: 3 | Status: SHIPPED | OUTPATIENT
Start: 2025-06-18 | End: 2026-06-18

## 2025-06-18 RX ORDER — ASPIRIN 81 MG/1
81 TABLET ORAL DAILY
Qty: 90 TABLET | Refills: 3 | Status: SHIPPED | OUTPATIENT
Start: 2025-06-18 | End: 2026-06-18

## 2025-06-18 RX ORDER — PSYLLIUM HUSK 0.4 G
1 CAPSULE ORAL DAILY
Qty: 90 TABLET | Refills: 3 | Status: SHIPPED | OUTPATIENT
Start: 2025-06-18 | End: 2026-06-18

## 2025-06-18 ASSESSMENT — PAIN SCALES - GENERAL: PAINLEVEL_OUTOF10: 0-NO PAIN
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
## 2025-06-21 DIAGNOSIS — B34.8 BK VIREMIA: ICD-10-CM

## 2025-06-21 DIAGNOSIS — I10 ESSENTIAL HYPERTENSION: ICD-10-CM

## 2025-06-21 DIAGNOSIS — Z94.0 KIDNEY REPLACED BY TRANSPLANT (HHS-HCC): ICD-10-CM

## 2025-06-21 DIAGNOSIS — Z94.0 IMMUNOSUPPRESSIVE MANAGEMENT ENCOUNTER FOLLOWING KIDNEY TRANSPLANT: ICD-10-CM

## 2025-06-21 DIAGNOSIS — Z79.899 IMMUNOSUPPRESSIVE MANAGEMENT ENCOUNTER FOLLOWING KIDNEY TRANSPLANT: ICD-10-CM

## 2025-06-23 RX ORDER — MULTIVITAMIN
1 TABLET ORAL DAILY
Qty: 90 TABLET | Refills: 3 | Status: SHIPPED | OUTPATIENT
Start: 2025-06-23

## 2025-06-25 DIAGNOSIS — Z79.899 IMMUNOSUPPRESSIVE MANAGEMENT ENCOUNTER FOLLOWING KIDNEY TRANSPLANT: ICD-10-CM

## 2025-06-25 DIAGNOSIS — Z94.0 IMMUNOSUPPRESSIVE MANAGEMENT ENCOUNTER FOLLOWING KIDNEY TRANSPLANT: ICD-10-CM

## 2025-06-25 DIAGNOSIS — B34.8 BK VIREMIA: ICD-10-CM

## 2025-06-25 DIAGNOSIS — Z94.0 KIDNEY REPLACED BY TRANSPLANT (HHS-HCC): ICD-10-CM

## 2025-06-25 DIAGNOSIS — I10 ESSENTIAL HYPERTENSION: ICD-10-CM

## 2025-06-25 PROCEDURE — RXMED WILLOW AMBULATORY MEDICATION CHARGE

## 2025-06-25 RX ORDER — MYCOPHENOLATE MOFETIL 250 MG/1
250 CAPSULE ORAL 2 TIMES DAILY
Qty: 60 CAPSULE | Refills: 11 | Status: SHIPPED | OUTPATIENT
Start: 2025-06-25 | End: 2026-06-25

## 2025-06-27 ENCOUNTER — SPECIALTY PHARMACY (OUTPATIENT)
Dept: PHARMACY | Facility: CLINIC | Age: 75
End: 2025-06-27

## 2025-06-27 PROCEDURE — RXMED WILLOW AMBULATORY MEDICATION CHARGE

## 2025-06-30 ENCOUNTER — PHARMACY VISIT (OUTPATIENT)
Dept: PHARMACY | Facility: CLINIC | Age: 75
End: 2025-06-30
Payer: MEDICARE

## 2025-06-30 ENCOUNTER — PHARMACY VISIT (OUTPATIENT)
Dept: PHARMACY | Facility: CLINIC | Age: 75
End: 2025-06-30
Payer: COMMERCIAL

## 2025-07-07 ENCOUNTER — LAB (OUTPATIENT)
Dept: LAB | Facility: HOSPITAL | Age: 75
End: 2025-07-07
Payer: MEDICARE

## 2025-07-07 DIAGNOSIS — Z94.0 KIDNEY TRANSPLANT STATUS: Primary | ICD-10-CM

## 2025-07-07 DIAGNOSIS — Z94.0 KIDNEY REPLACED BY TRANSPLANT (HHS-HCC): ICD-10-CM

## 2025-07-07 LAB
ALBUMIN SERPL BCP-MCNC: 4 G/DL (ref 3.4–5)
ANION GAP SERPL CALC-SCNC: 11 MMOL/L (ref 10–20)
BUN SERPL-MCNC: 20 MG/DL (ref 6–23)
CALCIUM SERPL-MCNC: 9.2 MG/DL (ref 8.6–10.3)
CHLORIDE SERPL-SCNC: 103 MMOL/L (ref 98–107)
CO2 SERPL-SCNC: 29 MMOL/L (ref 21–32)
CREAT SERPL-MCNC: 1.36 MG/DL (ref 0.5–1.3)
EGFRCR SERPLBLD CKD-EPI 2021: 55 ML/MIN/1.73M*2
ERYTHROCYTE [DISTWIDTH] IN BLOOD BY AUTOMATED COUNT: 13.7 % (ref 11.5–14.5)
GLUCOSE SERPL-MCNC: 128 MG/DL (ref 74–99)
HCT VFR BLD AUTO: 41.5 % (ref 41–52)
HGB BLD-MCNC: 13.2 G/DL (ref 13.5–17.5)
MAGNESIUM SERPL-MCNC: 1.66 MG/DL (ref 1.6–2.4)
MCH RBC QN AUTO: 31.4 PG (ref 26–34)
MCHC RBC AUTO-ENTMCNC: 31.8 G/DL (ref 32–36)
MCV RBC AUTO: 99 FL (ref 80–100)
NRBC BLD-RTO: 0 /100 WBCS (ref 0–0)
PHOSPHATE SERPL-MCNC: 3 MG/DL (ref 2.5–4.9)
PLATELET # BLD AUTO: 132 X10*3/UL (ref 150–450)
POTASSIUM SERPL-SCNC: 3.9 MMOL/L (ref 3.5–5.3)
RBC # BLD AUTO: 4.2 X10*6/UL (ref 4.5–5.9)
SODIUM SERPL-SCNC: 139 MMOL/L (ref 136–145)
TACROLIMUS BLD-MCNC: 4.8 NG/ML
WBC # BLD AUTO: 4.6 X10*3/UL (ref 4.4–11.3)

## 2025-07-07 PROCEDURE — 80197 ASSAY OF TACROLIMUS: CPT

## 2025-07-07 PROCEDURE — 85027 COMPLETE CBC AUTOMATED: CPT

## 2025-07-07 PROCEDURE — 36415 COLL VENOUS BLD VENIPUNCTURE: CPT

## 2025-07-07 PROCEDURE — 83735 ASSAY OF MAGNESIUM: CPT

## 2025-07-07 PROCEDURE — 80069 RENAL FUNCTION PANEL: CPT

## 2025-07-07 PROCEDURE — 87799 DETECT AGENT NOS DNA QUANT: CPT

## 2025-07-08 ENCOUNTER — LAB (OUTPATIENT)
Dept: LAB | Facility: HOSPITAL | Age: 75
End: 2025-07-08
Payer: MEDICARE

## 2025-07-08 DIAGNOSIS — R79.89 ELEVATED SERUM CREATININE: ICD-10-CM

## 2025-07-08 DIAGNOSIS — Z94.0 IMMUNOSUPPRESSIVE MANAGEMENT ENCOUNTER FOLLOWING KIDNEY TRANSPLANT: ICD-10-CM

## 2025-07-08 DIAGNOSIS — Z79.899 IMMUNOSUPPRESSIVE MANAGEMENT ENCOUNTER FOLLOWING KIDNEY TRANSPLANT: ICD-10-CM

## 2025-07-08 DIAGNOSIS — Z94.0 KIDNEY REPLACED BY TRANSPLANT (HHS-HCC): ICD-10-CM

## 2025-07-08 DIAGNOSIS — B34.8 BK VIREMIA: ICD-10-CM

## 2025-07-08 LAB
BKV DNA SERPL NAA+PROBE-LOG#: ABNORMAL {LOG_COPIES}/ML
LABORATORY COMMENT REPORT: ABNORMAL

## 2025-07-10 LAB
ALLOSURE SCORE - KIDNEY: 0.2 %
CAREDX_ORDER_ID: NORMAL
CENTER_ORDER_ID: NORMAL
CLIENT SPECIMEN ID - ALLOSURE: NORMAL
DONOR RELATION - ALLOSURE: NORMAL
NOTES - ALLOSURE: NORMAL
RELATIVE CHANGE VALUE - KIDNEY: NORMAL
TEST COMMENTS - ALLOSURE: NORMAL
TIME POST TX - ALLOSURE: NORMAL
TRANSPLANTED ORGAN - ALLOSURE: NORMAL
TX DATE - ALLOSURE/ALLOMAP: NORMAL
WP_ORDER_ID: NORMAL

## 2025-07-25 ENCOUNTER — SPECIALTY PHARMACY (OUTPATIENT)
Dept: PHARMACY | Facility: CLINIC | Age: 75
End: 2025-07-25

## 2025-07-30 ENCOUNTER — SPECIALTY PHARMACY (OUTPATIENT)
Dept: PHARMACY | Facility: CLINIC | Age: 75
End: 2025-07-30

## 2025-07-30 PROCEDURE — RXMED WILLOW AMBULATORY MEDICATION CHARGE

## 2025-07-31 ENCOUNTER — PHARMACY VISIT (OUTPATIENT)
Dept: PHARMACY | Facility: CLINIC | Age: 75
End: 2025-07-31
Payer: COMMERCIAL

## 2025-08-05 ENCOUNTER — LAB (OUTPATIENT)
Dept: LAB | Facility: HOSPITAL | Age: 75
End: 2025-08-05
Payer: MEDICARE

## 2025-08-05 ENCOUNTER — DOCUMENTATION (OUTPATIENT)
Facility: HOSPITAL | Age: 75
End: 2025-08-05

## 2025-08-05 DIAGNOSIS — Z94.0 KIDNEY TRANSPLANT STATUS: Primary | ICD-10-CM

## 2025-08-05 DIAGNOSIS — Z94.0 KIDNEY REPLACED BY TRANSPLANT (HHS-HCC): ICD-10-CM

## 2025-08-05 LAB
ALBUMIN SERPL BCP-MCNC: 3.9 G/DL (ref 3.4–5)
ANION GAP SERPL CALC-SCNC: 10 MMOL/L (ref 10–20)
BUN SERPL-MCNC: 18 MG/DL (ref 6–23)
CALCIUM SERPL-MCNC: 9.2 MG/DL (ref 8.6–10.6)
CHLORIDE SERPL-SCNC: 103 MMOL/L (ref 98–107)
CO2 SERPL-SCNC: 30 MMOL/L (ref 21–32)
CREAT SERPL-MCNC: 1.29 MG/DL (ref 0.5–1.3)
EGFRCR SERPLBLD CKD-EPI 2021: 59 ML/MIN/1.73M*2
ERYTHROCYTE [DISTWIDTH] IN BLOOD BY AUTOMATED COUNT: 13.2 % (ref 11.5–14.5)
GLUCOSE SERPL-MCNC: 108 MG/DL (ref 74–99)
HCT VFR BLD AUTO: 40.6 % (ref 41–52)
HGB BLD-MCNC: 12.8 G/DL (ref 13.5–17.5)
MAGNESIUM SERPL-MCNC: 1.74 MG/DL (ref 1.6–2.4)
MCH RBC QN AUTO: 31.2 PG (ref 26–34)
MCHC RBC AUTO-ENTMCNC: 31.5 G/DL (ref 32–36)
MCV RBC AUTO: 99 FL (ref 80–100)
NRBC BLD-RTO: 0 /100 WBCS (ref 0–0)
PHOSPHATE SERPL-MCNC: 2.5 MG/DL (ref 2.5–4.9)
PLATELET # BLD AUTO: 130 X10*3/UL (ref 150–450)
POTASSIUM SERPL-SCNC: 3.3 MMOL/L (ref 3.5–5.3)
RBC # BLD AUTO: 4.1 X10*6/UL (ref 4.5–5.9)
SODIUM SERPL-SCNC: 140 MMOL/L (ref 136–145)
TACROLIMUS BLD-MCNC: 4.3 NG/ML
WBC # BLD AUTO: 4.7 X10*3/UL (ref 4.4–11.3)

## 2025-08-05 PROCEDURE — 87799 DETECT AGENT NOS DNA QUANT: CPT

## 2025-08-05 PROCEDURE — 83735 ASSAY OF MAGNESIUM: CPT

## 2025-08-05 PROCEDURE — 80197 ASSAY OF TACROLIMUS: CPT

## 2025-08-05 PROCEDURE — 85027 COMPLETE CBC AUTOMATED: CPT

## 2025-08-05 PROCEDURE — 80069 RENAL FUNCTION PANEL: CPT

## 2025-08-05 PROCEDURE — 36415 COLL VENOUS BLD VENIPUNCTURE: CPT

## 2025-08-06 LAB
BKV DNA SERPL NAA+PROBE-LOG#: NORMAL {LOG_COPIES}/ML
LABORATORY COMMENT REPORT: NOT DETECTED

## 2025-08-19 ENCOUNTER — SPECIALTY PHARMACY (OUTPATIENT)
Dept: PHARMACY | Facility: CLINIC | Age: 75
End: 2025-08-19

## 2025-08-27 ENCOUNTER — SPECIALTY PHARMACY (OUTPATIENT)
Dept: PHARMACY | Facility: CLINIC | Age: 75
End: 2025-08-27

## 2025-08-27 PROCEDURE — RXMED WILLOW AMBULATORY MEDICATION CHARGE

## 2025-08-28 ENCOUNTER — PHARMACY VISIT (OUTPATIENT)
Dept: PHARMACY | Facility: CLINIC | Age: 75
End: 2025-08-28
Payer: COMMERCIAL